# Patient Record
Sex: FEMALE | URBAN - METROPOLITAN AREA
[De-identification: names, ages, dates, MRNs, and addresses within clinical notes are randomized per-mention and may not be internally consistent; named-entity substitution may affect disease eponyms.]

---

## 2019-10-22 ENCOUNTER — TELEPHONE (OUTPATIENT)
Dept: PSYCHIATRY | Facility: CLINIC | Age: 39
End: 2019-10-22

## 2019-10-22 NOTE — TELEPHONE ENCOUNTER
PSYCHIATRY CLINIC PHONE INTAKE     SERVICES REQUESTED / INTERESTED IN          Other:  OCD Tx w/ Yousuf Castaneda    Presenting Problem and Brief History                              What would you like to be seen for? (brief description):  Pt was referred to see . Lately she;s been having intrusive and obsessive thoughts. She thoughts are always end with her being a terrible person and she can't do anything good. These thoughts are making the anxiety and depression worse. She doesn't get distinct panic attacks, but she feels a chronic state of high anxiety or panic. She knows stress can be trigger. For example, when she's in the middle of a move. She doing fairly well with her eating disorder and works with a dietician, so its not a major issue. She's currently takes prozac 120mg daily in the evening, klonopin 0.5mg mg in the morning and 0.75mg in the evening, seraquel 50mg in the morning and 75mg at bedtime, vyvanse 40mg in the morning and 20mg at noon. Sleep is hasn't been good in the last week or so. She can fall asleep but has been waking up, often with headaches. 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     Have you received a mental health diagnosis? Yes   Which one (s): OCD, NASIR, Eating Disorder (not otherwise specified), and depression  Is there any history of developmental delay?  No   Are you currently seeing a mental health provider?  Yes            Who / month last seen:  Dr.Erin Greenberg(currently psychiatrist is acting as her therapist)  Do you have mental health records elsewhere?  Yes  Will you sign a release so we can obtain them?  Yes    Have you ever been hospitalized for psychiatric reasons?  Yes  Describe:  Within the last 10 years for the eating disorder (9 times mostly at [place]). She also did a PHP for OCD through [facility].     Do you have current thoughts of self-harm?  No    Do you currently have thoughts of harming others?  No       Substance Use History      Do you have any history of alcohol / illicit drug use?  No  Describe:  NA  Have you ever received treatment for this?  No    Describe:  NA     Social History     Does the patient have a guardian?  No    Name / number: NA  Have you had an ACT team in last 12 months?  No  Describe: NA   Do you have any current or past legal issues?  NA  Describe: NA   OK to leave a detailed voicemail?  Yes    Medical/ Surgical History                                 There is no problem list on file for this patient.         Medications             No current outpatient medications on file.         DISPOSITION      10/22/19 Intake complete.  recommended the pt to see . Sending to  for review.     Ayanna Larson,

## 2021-04-23 ENCOUNTER — TRANSFERRED RECORDS (OUTPATIENT)
Dept: HEALTH INFORMATION MANAGEMENT | Facility: CLINIC | Age: 41
End: 2021-04-23

## 2021-05-08 ENCOUNTER — TRANSFERRED RECORDS (OUTPATIENT)
Dept: HEALTH INFORMATION MANAGEMENT | Facility: CLINIC | Age: 41
End: 2021-05-08

## 2021-05-18 ENCOUNTER — TRANSCRIBE ORDERS (OUTPATIENT)
Dept: OTHER | Age: 41
End: 2021-05-18

## 2021-05-18 DIAGNOSIS — G90.9 AUTONOMIC DYSFUNCTION: Primary | ICD-10-CM

## 2021-05-27 NOTE — TELEPHONE ENCOUNTER
Action    Action Taken 5-27: Requested from PN:    ZioPatch    EKG Strips   4-28-21 5-8-21 5-27: Requested echo stress 5-26-21 from [facility]  5-27: resolved echo from [facility]  5-28: sent EKG to scanning  6-29: sent second request for ziopatch  7-2: sent ziopatch to scanning

## 2021-07-12 ENCOUNTER — OFFICE VISIT (OUTPATIENT)
Dept: CARDIOLOGY | Facility: CLINIC | Age: 41
End: 2021-07-12
Attending: INTERNAL MEDICINE
Payer: COMMERCIAL

## 2021-07-12 VITALS — OXYGEN SATURATION: 100 % | WEIGHT: 150 LBS

## 2021-07-12 DIAGNOSIS — G90.9 AUTONOMIC DYSFUNCTION: ICD-10-CM

## 2021-07-12 PROCEDURE — 99204 OFFICE O/P NEW MOD 45 MIN: CPT | Performed by: INTERNAL MEDICINE

## 2021-07-12 PROCEDURE — G0463 HOSPITAL OUTPT CLINIC VISIT: HCPCS | Mod: 25

## 2021-07-12 PROCEDURE — 93005 ELECTROCARDIOGRAM TRACING: CPT

## 2021-07-12 RX ORDER — AZITHROMYCIN 250 MG/1
TABLET, FILM COATED ORAL
COMMUNITY
Start: 2021-07-11 | End: 2021-07-16

## 2021-07-12 RX ORDER — MAGNESIUM 200 MG
TABLET ORAL
COMMUNITY

## 2021-07-12 RX ORDER — DAPSONE 50 MG/G
GEL TOPICAL PRN
COMMUNITY
Start: 2020-07-29

## 2021-07-12 RX ORDER — IBUPROFEN 200 MG
800 TABLET ORAL
COMMUNITY

## 2021-07-12 RX ORDER — LEVOTHYROXINE SODIUM 200 UG/1
200 TABLET ORAL
COMMUNITY
Start: 2021-06-09

## 2021-07-12 RX ORDER — LISDEXAMFETAMINE DIMESYLATE 20 MG/1
CAPSULE ORAL
COMMUNITY
Start: 2019-07-26

## 2021-07-12 RX ORDER — DOXYCYCLINE HYCLATE 20 MG
TABLET ORAL
COMMUNITY
Start: 2021-05-13

## 2021-07-12 RX ORDER — FLUOXETINE 40 MG/1
120 CAPSULE ORAL
COMMUNITY
Start: 2019-12-17

## 2021-07-12 RX ORDER — CLONAZEPAM 0.5 MG/1
TABLET ORAL
COMMUNITY
Start: 2019-09-04

## 2021-07-12 RX ORDER — LEVOTHYROXINE SODIUM 175 UG/1
175 TABLET ORAL
COMMUNITY
Start: 2020-07-10

## 2021-07-12 RX ORDER — TRETINOIN 0.25 MG/G
CREAM TOPICAL
COMMUNITY
Start: 2020-07-29

## 2021-07-12 RX ORDER — INSULIN PUMP CART,CONT INF,RF
CARTRIDGE (EA) SUBCUTANEOUS
COMMUNITY
Start: 2021-06-11

## 2021-07-12 RX ORDER — PROCHLORPERAZINE 25 MG/1
SUPPOSITORY RECTAL
COMMUNITY
Start: 2021-03-16

## 2021-07-12 RX ORDER — SODIUM CHLORIDE 9 MG/ML
INJECTION, SOLUTION INTRAVENOUS CONTINUOUS
Status: CANCELLED | OUTPATIENT
Start: 2021-07-12

## 2021-07-12 RX ORDER — QUETIAPINE FUMARATE 25 MG/1
TABLET, FILM COATED ORAL
COMMUNITY
Start: 2019-09-04

## 2021-07-12 RX ORDER — LIDOCAINE 40 MG/G
CREAM TOPICAL
Status: CANCELLED | OUTPATIENT
Start: 2021-07-12

## 2021-07-12 RX ORDER — ATORVASTATIN CALCIUM 10 MG/1
20 TABLET, FILM COATED ORAL DAILY
COMMUNITY
Start: 2019-10-03

## 2021-07-12 RX ORDER — POLYETHYLENE GLYCOL 3350 17 G/17G
17 POWDER, FOR SOLUTION ORAL DAILY
COMMUNITY

## 2021-07-12 RX ORDER — UBROGEPANT 50 MG/1
TABLET ORAL
COMMUNITY
Start: 2021-04-15

## 2021-07-12 RX ORDER — BLOOD-GLUCOSE CONTROL, NORMAL
EACH MISCELLANEOUS
COMMUNITY
Start: 2021-02-15

## 2021-07-12 RX ORDER — PROCHLORPERAZINE 25 MG/1
SUPPOSITORY RECTAL
COMMUNITY
Start: 2021-01-20

## 2021-07-12 RX ORDER — MULTIVITAMIN WITH FOLIC ACID 400 MCG
1 TABLET ORAL
COMMUNITY

## 2021-07-12 ASSESSMENT — PAIN SCALES - GENERAL: PAINLEVEL: NO PAIN (0)

## 2021-07-12 NOTE — NURSING NOTE
Chief Complaint   Patient presents with     New Patient     autonomic dysfunction      Vitals were taken, medications reconciled, and EKG was performed.    Nelson Cutler, EMT  3:26 PM

## 2021-07-12 NOTE — LETTER
7/12/2021      RE: Janis Sheehan  [address] [place] [address]       Dear Colleague,    Thank you for the opportunity to participate in the care of your patient, Janis Sheehan, at the [facility] [place] [facility]. Please see a copy of my visit note below.    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  Janis's's principal complaint has long been orthostatic intolerance with hypotension.  This problem seems to have become worse lately.  She in particular notes that she has lightheadedness and near syncope after exercise.    Janis is an avid biker and will have her symptoms after vigorous exertion.  She has documented high blood pressures in the 80/60 range after exercise.  She has previously had a stress echo which was normal in the [facility] system.  Her blood pressure during 24-hour recording was also in the normal range.  A tilt table study done in [place] was suggestive of autonomic neuropathy but we do not have the results.    From a practical perspective Janis's principal underlying comorbidity is diabetes mellitus.  She has been insulin-dependent for more than 30 years.    In terms of other issues Janis does complain of Raynaud's phenomenon and also has some symptoms of burning in the hands (less so in the feet) that are suggestive of autonomic diabetic neuropathy.  She also has some skin changes in her hands of uncertain cause.    At today's visit we discussed the likelihood that diabetes may be contributing to her symptom complex and we will undertake an autonomic evaluation to try and ascertain the severity of the abnormality.  Janis voiced understanding and agreement to proceed.    PAST MEDICAL HISTORY:  No past medical history on file.    CURRENT  MEDICATIONS:  Current Outpatient Medications   Medication Sig Dispense Refill     amoxicillin-clavulanate (AUGMENTIN) 875-125 MG tablet        atorvastatin (LIPITOR) 10 MG tablet        azithromycin (ZITHROMAX) 250 MG tablet Two tablets the first day, one daily days 2-5       blood glucose (NO BRAND SPECIFIED) test strip Use 1 strip to test blood glucose 5 times a day.       blood glucose calibration (FREESTYLE CONTROL) solution To calibrate meter       clonazePAM (KLONOPIN) 0.5 MG tablet Take 1 tablet by mouth every morning, take 1 and 1/2 tablets every night at bedtime and additional 1 tablet as needed daily for anxiety.       coenzyme Q-10 10 MG CAPS Take 400 mg by mouth       Continuous Blood Gluc Sensor (DEXCOM G6 SENSOR) MISC Change every 10 days.  (Dexcom G6 Glucose Sensor, #STS-OE-003       Continuous Blood Gluc Transmit (DEXCOM G6 TRANSMITTER) MISC Replace every 90 days.  (Dexcom G6 Transmitter, #STT-OE-002)       dapsone 5 % topical gel        doxycycline hyclate (PERIOSTAT) 20 MG tablet Take 1 tablet by mouth 2 times daily with meals       FLUoxetine (PROZAC) 40 MG capsule Take 120 mg by mouth       ibuprofen (ADVIL/MOTRIN) 200 MG tablet Take 800 mg by mouth       insulin aspart (FIASP) 100 UNIT/ML vial 40-70 units in insulin pump       Insulin Disposable Pump (OMNIPOD 5 PACK) MISC Change every 2 to 3 days.       levonorgestrel (MIRENA) 20 MCG/24HR IUD 1 each by Intrauterine route       levothyroxine (SYNTHROID/LEVOTHROID) 175 MCG tablet Take 175 mcg by mouth every 48 hours       levothyroxine (SYNTHROID/LEVOTHROID) 200 MCG tablet Take 200 mcg by mouth       lisdexamfetamine (VYVANSE) 20 MG capsule The patient takes 40 mg in the AM and 20 mg at noon.       magnesium 200 MG TABS        Multiple Vitamin (DAILY-BROCK) TABS Take 1 tablet by mouth       omeprazole (PRILOSEC) 20 MG DR capsule Take 20 mg by mouth       polyethylene glycol (MIRALAX) 17 GM/Dose powder Take 17 g by mouth daily       QUEtiapine  (SEROQUEL) 25 MG tablet Take 2 tablets by mouth every morning and 3 tablets at bedtime       tretinoin (RETIN-A) 0.025 % external cream        ubrogepant (UBRELVY) 50 MG tablet 1-2 tab at onset of migraine, may repeat in 2 hours, max 2 dose per day, 2 day/week, 8 day/month.         PAST SURGICAL HISTORY:  No past surgical history on file.    ALLERGIES:     Allergies   Allergen Reactions     Oxcarbazepine Unknown and Rash       FAMILY HISTORY:  No family history on file.    SOCIAL HISTORY:  Social History     Tobacco Use     Smoking status: Never Smoker     Smokeless tobacco: Never Used   Substance Use Topics     Alcohol use: None     Drug use: None       ROS:   Constitutional: No fever, chills, or sweats. Weight stable.   ENT: No visual disturbance, ear ache, epistaxis, sore throat.   Cardiovascular: As per HPI.   Respiratory: No cough, hemoptysis.    GI: No nausea, vomiting, .   : No hematuria.   Integument: Negative.   Psychiatric: Negative.   Hematologic:  Easy bruising, no easy bleeding.  Neuro: Negative.   Endocrinology: No significant heat or cold intolerance   Musculoskeletal: No myalgia.    Exam:  Wt 68 kg (150 lb)   SpO2 100%   GENERAL APPEARANCE: healthy, alert and no distress  HEENT: no icterus, no xanthelasmas, normal pupil size and reaction, normal palate, , no central cyanosis  NECK: no adenopathy, no asymmetry, masses, or scars, JVP not elevated  RESPIRATORY: lungs clear to auscultation - no rales, rhonchi or wheezes, no use of accessory muscles, no retractions, respirations are unlabored, normal respiratory rate  CARDIOVASCULAR: regular rhythm, .  ABDOMEN: soft, non tender,   EXTREMITIES: peripheral pulses normal, no edema, no bruits  NEURO: alert and oriented to person/place/time, normal speech, gait and affect  SKIN: no ecchymoses, no rashes    Labs:  CBC RESULTS:   No results found for: WBC, RBC, HGB, HCT, MCV, MCH, MCHC, RDW, PLT    BMP RESULTS:  No results found for: NA, POTASSIUM, CHLORIDE,  CO2, ANIONGAP, GLC, BUN, CR, GFRESTIMATED, GFRESTBLACK, TERE     INR RESULTS:  No results found for: INR    Procedures:  PULMONARY FUNCTION TESTS:   No flowsheet data found.      ECHOCARDIOGRAM:   No results found for this or any previous visit (from the past 8760 hour(s)).     Assessment and Plan:   1.  Diabetes mellitus-insulin-dependent  2.  Lightheadedness and dizziness especially following exercise-possibly related to diabetic neuropathy  3.  Raynaud's phenomena    Plan  1.  Arrange for autonomic testing in the next several weeks  2.  Follow-up clinic visit to be determined after #1    Total elapsed time with chart review, person-to-person and documentation 45 minutes    I very much appreciated the opportunity to see and assess Janis Sheehan in the clinic. Please do not hesitate to contact my office if you have any questions or concerns.      Julio Stone MD  Cardiac Arrhythmia Service  [facility]  [phone]      CC  SELF, REFERRED

## 2021-07-12 NOTE — PROGRESS NOTES
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  Janis's's principal complaint has long been orthostatic intolerance with hypotension.  This problem seems to have become worse lately.  She in particular notes that she has lightheadedness and near syncope after exercise.    Janis is an avid biker and will have her symptoms after vigorous exertion.  She has documented high blood pressures in the 80/60 range after exercise.  She has previously had a stress echo which was normal in the [facility] system.  Her blood pressure during 24-hour recording was also in the normal range.  A tilt table study done in [place] was suggestive of autonomic neuropathy but we do not have the results.    From a practical perspective Janis's principal underlying comorbidity is diabetes mellitus.  She has been insulin-dependent for more than 30 years.    In terms of other issues Janis does complain of Raynaud's phenomenon and also has some symptoms of burning in the hands (less so in the feet) that are suggestive of autonomic diabetic neuropathy.  She also has some skin changes in her hands of uncertain cause.    At today's visit we discussed the likelihood that diabetes may be contributing to her symptom complex and we will undertake an autonomic evaluation to try and ascertain the severity of the abnormality.  Janis voiced understanding and agreement to proceed.    PAST MEDICAL HISTORY:  No past medical history on file.    CURRENT MEDICATIONS:  Current Outpatient Medications   Medication Sig Dispense Refill     amoxicillin-clavulanate (AUGMENTIN) 875-125 MG tablet        atorvastatin (LIPITOR) 10 MG tablet        azithromycin (ZITHROMAX) 250 MG tablet Two tablets the first day, one daily days 2-5       blood glucose (NO BRAND SPECIFIED) test strip Use 1 strip to test blood glucose 5 times a day.        blood glucose calibration (FREESTYLE CONTROL) solution To calibrate meter       clonazePAM (KLONOPIN) 0.5 MG tablet Take 1 tablet by mouth every morning, take 1 and 1/2 tablets every night at bedtime and additional 1 tablet as needed daily for anxiety.       coenzyme Q-10 10 MG CAPS Take 400 mg by mouth       Continuous Blood Gluc Sensor (DEXCOM G6 SENSOR) MISC Change every 10 days.  (Dexcom G6 Glucose Sensor, #[id number]-003       Continuous Blood Gluc Transmit (DEXCOM G6 TRANSMITTER) MISC Replace every 90 days.  (Dexcom G6 Transmitter, #STT-OE-002)       dapsone 5 % topical gel        doxycycline hyclate (PERIOSTAT) 20 MG tablet Take 1 tablet by mouth 2 times daily with meals       FLUoxetine (PROZAC) 40 MG capsule Take 120 mg by mouth       ibuprofen (ADVIL/MOTRIN) 200 MG tablet Take 800 mg by mouth       insulin aspart (FIASP) 100 UNIT/ML vial 40-70 units in insulin pump       Insulin Disposable Pump (OMNIPOD 5 PACK) MISC Change every 2 to 3 days.       levonorgestrel (MIRENA) 20 MCG/24HR IUD 1 each by Intrauterine route       levothyroxine (SYNTHROID/LEVOTHROID) 175 MCG tablet Take 175 mcg by mouth every 48 hours       levothyroxine (SYNTHROID/LEVOTHROID) 200 MCG tablet Take 200 mcg by mouth       lisdexamfetamine (VYVANSE) 20 MG capsule The patient takes 40 mg in the AM and 20 mg at noon.       magnesium 200 MG TABS        Multiple Vitamin (DAILY-BROCK) TABS Take 1 tablet by mouth       omeprazole (PRILOSEC) 20 MG DR capsule Take 20 mg by mouth       polyethylene glycol (MIRALAX) 17 GM/Dose powder Take 17 g by mouth daily       QUEtiapine (SEROQUEL) 25 MG tablet Take 2 tablets by mouth every morning and 3 tablets at bedtime       tretinoin (RETIN-A) 0.025 % external cream        ubrogepant (UBRELVY) 50 MG tablet 1-2 tab at onset of migraine, may repeat in 2 hours, max 2 dose per day, 2 day/week, 8 day/month.         PAST SURGICAL HISTORY:  No past surgical history on file.    ALLERGIES:     Allergies   Allergen  Reactions     Oxcarbazepine Unknown and Rash       FAMILY HISTORY:  No family history on file.    SOCIAL HISTORY:  Social History     Tobacco Use     Smoking status: Never Smoker     Smokeless tobacco: Never Used   Substance Use Topics     Alcohol use: None     Drug use: None       ROS:   Constitutional: No fever, chills, or sweats. Weight stable.   ENT: No visual disturbance, ear ache, epistaxis, sore throat.   Cardiovascular: As per HPI.   Respiratory: No cough, hemoptysis.    GI: No nausea, vomiting, .   : No hematuria.   Integument: Negative.   Psychiatric: Negative.   Hematologic:  Easy bruising, no easy bleeding.  Neuro: Negative.   Endocrinology: No significant heat or cold intolerance   Musculoskeletal: No myalgia.    Exam:  Wt 68 kg (150 lb)   SpO2 100%   GENERAL APPEARANCE: healthy, alert and no distress  HEENT: no icterus, no xanthelasmas, normal pupil size and reaction, normal palate, , no central cyanosis  NECK: no adenopathy, no asymmetry, masses, or scars, JVP not elevated  RESPIRATORY: lungs clear to auscultation - no rales, rhonchi or wheezes, no use of accessory muscles, no retractions, respirations are unlabored, normal respiratory rate  CARDIOVASCULAR: regular rhythm, .  ABDOMEN: soft, non tender,   EXTREMITIES: peripheral pulses normal, no edema, no bruits  NEURO: alert and oriented to person/place/time, normal speech, gait and affect  SKIN: no ecchymoses, no rashes    Labs:  CBC RESULTS:   No results found for: WBC, RBC, HGB, HCT, MCV, MCH, MCHC, RDW, PLT    BMP RESULTS:  No results found for: NA, POTASSIUM, CHLORIDE, CO2, ANIONGAP, GLC, BUN, CR, GFRESTIMATED, GFRESTBLACK, TERE     INR RESULTS:  No results found for: INR    Procedures:  PULMONARY FUNCTION TESTS:   No flowsheet data found.      ECHOCARDIOGRAM:   No results found for this or any previous visit (from the past 8760 hour(s)).     Assessment and Plan:   1.  Diabetes mellitus-insulin-dependent  2.  Lightheadedness and dizziness  especially following exercise-possibly related to diabetic neuropathy  3.  Raynaud's phenomena    Plan  1.  Arrange for autonomic testing in the next several weeks  2.  Follow-up clinic visit to be determined after #1    Total elapsed time with chart review, person-to-person and documentation 45 minutes    I very much appreciated the opportunity to see and assess Janis Sheehan in the clinic. Please do not hesitate to contact my office if you have any questions or concerns.      Julio Stone MD  Cardiac Arrhythmia Service  [facility]  [phone]      CC  SELF, REFERRED

## 2021-07-12 NOTE — PATIENT INSTRUCTIONS
You were seen in the Electrophysiology Clinic today by: Dr Stone    Plan:   -Autonomic/Tilt table test with Dr Stone  -Labs    Your Care Team:  EP Cardiology   Telephone Number     Nurse Line [phone]     For scheduling appts or procedures:    Chandni Ferro   [phone]   For the Device Clinic (Pacemakers, ICDs, Loop Recorders)    During business hours: [phone]  After business hours:   [phone]- select option 4 and ask for job code [id number].     On-call cardiologist for after hours or on weekends: [phone], option #4, and ask to speak to the on-call cardiologist.     Cardiovascular Clinic:   [address] [place] [address]. [place] [address]      As always, Thank you for trusting us with your health care needs!

## 2021-07-13 ENCOUNTER — TELEPHONE (OUTPATIENT)
Dept: CARDIOLOGY | Facility: CLINIC | Age: 41
End: 2021-07-13

## 2021-07-13 NOTE — TELEPHONE ENCOUNTER
EP Scheduling called the patient to schedule Tilt Table Study - offered 8/4 with Dr. Stone. The number [phone] was left for the patient to return the call and schedule the procedure.    Chandni Ferro  Periop Electrophysiology   [phone]

## 2021-07-15 ENCOUNTER — LAB (OUTPATIENT)
Dept: LAB | Facility: CLINIC | Age: 41
End: 2021-07-15
Payer: COMMERCIAL

## 2021-07-15 DIAGNOSIS — Z11.59 ENCOUNTER FOR SCREENING FOR OTHER VIRAL DISEASES: ICD-10-CM

## 2021-07-15 DIAGNOSIS — G90.9 AUTONOMIC DYSFUNCTION: ICD-10-CM

## 2021-07-15 LAB
ANION GAP SERPL CALCULATED.3IONS-SCNC: 2 MMOL/L (ref 3–14)
ATRIAL RATE - MUSE: 70 BPM
BUN SERPL-MCNC: 18 MG/DL (ref 7–30)
CALCIUM SERPL-MCNC: 8.8 MG/DL (ref 8.5–10.1)
CHLORIDE BLD-SCNC: 105 MMOL/L (ref 94–109)
CO2 SERPL-SCNC: 29 MMOL/L (ref 20–32)
CREAT SERPL-MCNC: 0.7 MG/DL (ref 0.52–1.04)
CRP SERPL-MCNC: <2.9 MG/L (ref 0–8)
DIASTOLIC BLOOD PRESSURE - MUSE: NORMAL MMHG
ERYTHROCYTE [DISTWIDTH] IN BLOOD BY AUTOMATED COUNT: 11.8 % (ref 10–15)
ERYTHROCYTE [SEDIMENTATION RATE] IN BLOOD BY WESTERGREN METHOD: 11 MM/HR (ref 0–20)
GFR SERPL CREATININE-BSD FRML MDRD: >90 ML/MIN/1.73M2
GLUCOSE BLD-MCNC: 277 MG/DL (ref 70–99)
HCT VFR BLD AUTO: 40.3 % (ref 35–47)
HGB BLD-MCNC: 13.4 G/DL (ref 11.7–15.7)
INTERPRETATION ECG - MUSE: NORMAL
MCH RBC QN AUTO: 30.3 PG (ref 26.5–33)
MCHC RBC AUTO-ENTMCNC: 33.3 G/DL (ref 31.5–36.5)
MCV RBC AUTO: 91 FL (ref 78–100)
P AXIS - MUSE: 59 DEGREES
PLATELET # BLD AUTO: 324 10E3/UL (ref 150–450)
POTASSIUM BLD-SCNC: 4.1 MMOL/L (ref 3.4–5.3)
PR INTERVAL - MUSE: 122 MS
QRS DURATION - MUSE: 90 MS
QT - MUSE: 412 MS
QTC - MUSE: 444 MS
R AXIS - MUSE: 56 DEGREES
RBC # BLD AUTO: 4.42 10E6/UL (ref 3.8–5.2)
SODIUM SERPL-SCNC: 136 MMOL/L (ref 133–144)
SYSTOLIC BLOOD PRESSURE - MUSE: NORMAL MMHG
T AXIS - MUSE: 58 DEGREES
TSH SERPL DL<=0.005 MIU/L-ACNC: 1.23 MU/L (ref 0.4–4)
VENTRICULAR RATE- MUSE: 70 BPM
WBC # BLD AUTO: 8.1 10E3/UL (ref 4–11)

## 2021-07-15 PROCEDURE — 36415 COLL VENOUS BLD VENIPUNCTURE: CPT

## 2021-07-15 PROCEDURE — 85027 COMPLETE CBC AUTOMATED: CPT

## 2021-07-15 PROCEDURE — 85652 RBC SED RATE AUTOMATED: CPT

## 2021-07-15 PROCEDURE — 86431 RHEUMATOID FACTOR QUANT: CPT

## 2021-07-15 PROCEDURE — 80048 BASIC METABOLIC PNL TOTAL CA: CPT

## 2021-07-15 PROCEDURE — 86038 ANTINUCLEAR ANTIBODIES: CPT

## 2021-07-15 PROCEDURE — 84443 ASSAY THYROID STIM HORMONE: CPT

## 2021-07-15 PROCEDURE — 86140 C-REACTIVE PROTEIN: CPT

## 2021-07-16 LAB
ANA PAT SER IF-IMP: NORMAL
ANA SER QL IF: NEGATIVE
ANA TITR SER IF: NORMAL {TITER}

## 2021-07-21 ENCOUNTER — LAB (OUTPATIENT)
Dept: LAB | Facility: CLINIC | Age: 41
End: 2021-07-21

## 2021-07-21 DIAGNOSIS — G90.9 AUTONOMIC DYSFUNCTION: ICD-10-CM

## 2021-07-21 PROCEDURE — 82384 ASSAY THREE CATECHOLAMINES: CPT | Mod: 90

## 2021-07-21 PROCEDURE — 83088 ASSAY OF HISTAMINE: CPT | Mod: 90

## 2021-07-24 LAB
CATECHOLS UR-IMP: NORMAL
CREAT 24H UR-MRATE: 1395 MG/D
CREAT UR-MCNC: 60 MG/DL
CREAT UR-MCNC: 61 MG/DL
DOPAMINE 24H UR-MRATE: 191 UG/D
DOPAMINE UR-MCNC: 82 UG/L
DOPAMINE/CREAT UR: 137 UG/G CRT
EPINEPH 24H UR-MRATE: 9 UG/D
EPINEPH UR-MCNC: 4 UG/L
EPINEPH/CREAT UR: 7 UG/G CRT
HISTAMINE 24H UR-MRATE: 29 UG/D
HISTAMINE UR-SCNC: 112 NMOL/L
HISTAMINE/CREAT 24H UR-SRTO: 184 NMOL/G CRT
NOREPINEPH 24H UR-MRATE: 42 UG/D
NOREPINEPH UR-MCNC: 18 UG/L
NOREPINEPH/CREAT UR: 30 UG/G CRT

## 2021-07-26 LAB — RHEUMATOID FACT SER NEPH-ACNC: <7 IU/ML

## 2021-07-28 ENCOUNTER — PRE VISIT (OUTPATIENT)
Dept: CARDIOLOGY | Facility: CLINIC | Age: 41
End: 2021-07-28

## 2021-08-01 ENCOUNTER — LAB (OUTPATIENT)
Dept: LAB | Facility: CLINIC | Age: 41
End: 2021-08-01
Payer: COMMERCIAL

## 2021-08-01 DIAGNOSIS — Z11.59 ENCOUNTER FOR SCREENING FOR OTHER VIRAL DISEASES: ICD-10-CM

## 2021-08-01 LAB — SARS-COV-2 RNA RESP QL NAA+PROBE: NEGATIVE

## 2021-08-01 PROCEDURE — U0003 INFECTIOUS AGENT DETECTION BY NUCLEIC ACID (DNA OR RNA); SEVERE ACUTE RESPIRATORY SYNDROME CORONAVIRUS 2 (SARS-COV-2) (CORONAVIRUS DISEASE [COVID-19]), AMPLIFIED PROBE TECHNIQUE, MAKING USE OF HIGH THROUGHPUT TECHNOLOGIES AS DESCRIBED BY CMS-2020-01-R: HCPCS

## 2021-08-01 PROCEDURE — U0005 INFEC AGEN DETEC AMPLI PROBE: HCPCS

## 2021-08-03 ENCOUNTER — TELEPHONE (OUTPATIENT)
Dept: CARDIOLOGY | Facility: CLINIC | Age: 41
End: 2021-08-03

## 2021-08-04 ENCOUNTER — HOSPITAL ENCOUNTER (OUTPATIENT)
Facility: CLINIC | Age: 41
Discharge: HOME | End: 2021-08-04
Attending: INTERNAL MEDICINE | Admitting: INTERNAL MEDICINE
Payer: COMMERCIAL

## 2021-08-04 ENCOUNTER — APPOINTMENT (OUTPATIENT)
Dept: MEDSURG UNIT | Facility: CLINIC | Age: 41
End: 2021-08-04
Attending: INTERNAL MEDICINE
Payer: COMMERCIAL

## 2021-08-04 VITALS
DIASTOLIC BLOOD PRESSURE: 77 MMHG | TEMPERATURE: 98.2 F | OXYGEN SATURATION: 100 % | HEART RATE: 70 BPM | RESPIRATION RATE: 16 BRPM | SYSTOLIC BLOOD PRESSURE: 123 MMHG

## 2021-08-04 DIAGNOSIS — G90.9 AUTONOMIC DYSFUNCTION: Primary | ICD-10-CM

## 2021-08-04 DIAGNOSIS — G90.9 AUTONOMIC DYSFUNCTION: ICD-10-CM

## 2021-08-04 PROCEDURE — 99220 PR INITIAL OBSERVATION CARE,LEVEL III: CPT | Mod: 25 | Performed by: INTERNAL MEDICINE

## 2021-08-04 PROCEDURE — 258N000003 HC RX IP 258 OP 636: Performed by: INTERNAL MEDICINE

## 2021-08-04 PROCEDURE — 95921 AUTONOMIC NRV PARASYM INERVJ: CPT | Performed by: INTERNAL MEDICINE

## 2021-08-04 PROCEDURE — 272N000001 HC OR GENERAL SUPPLY STERILE: Performed by: INTERNAL MEDICINE

## 2021-08-04 PROCEDURE — 999N000132 HC STATISTIC PP CARE STAGE 1

## 2021-08-04 PROCEDURE — 93660 TILT TABLE EVALUATION: CPT | Performed by: INTERNAL MEDICINE

## 2021-08-04 RX ORDER — MIDODRINE HYDROCHLORIDE 5 MG/1
5 TABLET ORAL 2 TIMES DAILY
Qty: 90 TABLET | Refills: 3 | Status: SHIPPED | OUTPATIENT
Start: 2021-08-04 | End: 2021-10-22

## 2021-08-04 RX ORDER — SODIUM CHLORIDE 9 MG/ML
INJECTION, SOLUTION INTRAVENOUS CONTINUOUS
Status: DISCONTINUED | OUTPATIENT
Start: 2021-08-04 | End: 2021-08-04 | Stop reason: HOSPADM

## 2021-08-04 RX ORDER — FLUDROCORTISONE ACETATE 0.1 MG/1
0.1 TABLET ORAL DAILY
Qty: 90 TABLET | Refills: 3 | Status: SHIPPED | OUTPATIENT
Start: 2021-08-04 | End: 2021-10-22

## 2021-08-04 RX ORDER — LIDOCAINE 40 MG/G
CREAM TOPICAL
Status: DISCONTINUED | OUTPATIENT
Start: 2021-08-04 | End: 2021-08-04 | Stop reason: HOSPADM

## 2021-08-04 RX ADMIN — SODIUM CHLORIDE 200 ML: 9 INJECTION, SOLUTION INTRAVENOUS at 13:09

## 2021-08-04 RX ADMIN — SODIUM CHLORIDE: 9 INJECTION, SOLUTION INTRAVENOUS at 13:10

## 2021-08-04 NOTE — H&P
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  Janis's's principal complaint has long been orthostatic intolerance with hypotension.       Janis is an avid biker and will have her symptoms after vigorous exertion.    She has previously had a stress echo which was normal      From a practical perspective Janis's principal underlying comorbidity is diabetes mellitus.  She has been insulin-dependent for more than 30 years.    RofS  Head and neck: No current complaints of visual or auditory symptoms does have migraines  Respiratory: No cough or sputum  Cardiac: Symptoms as in HPI.  No exertional chest pain  GI: No current symptoms  : No symptoms  Musculoskeletal: Patient does have Raynaud's phenomenon by history and recently had EMG which suggested normal abnormality in her hands.  Cutaneous: Complaints of swelling and tenderness/pain in the distal part of her hands but not in her feet  Neurology no symptoms   Constitutional: No complaints    Physical examination  Normotensive 99/76   head neck: No JVP elevation.  No evident asymmetry or scars  Chest: Clear  Cardiac: Regular rate and rhythm;  resting heart rate is 75  Neurologic: No localizing signs  Psychiatric: No abnormality detected  Cutaneous: No specific abnormality but see HPI    Impression  1.  Diabetes mellitus insulin-dependent  2.  Orthostatic hypotension which may be related to #1    Plan  Autonomic testing scheduled for today-consent signed    I very much appreciated the opportunity to see and assess Janis Sheehan in the hospital today. The note above summarizes my findings and current recommendations.  Please do not hesitate to contact my office if you have any questions or concerns.      Julio Stone MD  Cardiac Arrhythmia Service  [facility]  [phone]

## 2021-08-04 NOTE — Clinical Note
When patient initially stood, reported feeling dizzy and the sensation that her heart was racing. Symptoms reported to MD.

## 2021-08-04 NOTE — DISCHARGE INSTRUCTIONS
[facility]  DISCHARGE INSTRUCTIONS FOR   TILT TABLE    Date: August 4th, 2021      Findings: Your autonomic study was moderately abnormal    Plan:   -New prescriptions will be sent to [facility] in [place]  - Use compression shorts (athletic compression shorts).  - Increase salt intake in diet.  Vitassium supplement / Liquid IV (water additive) / Propel (water additive)  - Change positions carefully and slowly and maintain safe environment.  - Standing training and lower body isometric exercises (e.g. exercise bands).  - Follow up with Dr. Stone in 1-2 months    Cardiovascular Clinic:  [address] [place] [address], [address]    Your Care Team:        EP Cardiology      Telephone Number         Dr. Julio Barton                [phone]         Lizzette Becerra, RN    Vicki Pablo RN              [phone]         For Scheduling Appointments or              Procedures:      Chandni Chacko               [phone]       As always, Thank you for trusting us with your health care needs!

## 2021-08-04 NOTE — PROGRESS NOTES
Patient returned to 2A post tilt table study.  VSS.  PO food and fluids at bedside.  Awaiting Dr. Stone to see patient prior to discharge.

## 2021-08-04 NOTE — Clinical Note
Handoff report given in person to 2A PIERRE Main. Patient transported back to 2A via stretcher with CCL RN. Instructed that Dr. Stone would see patient prior to discharge.

## 2021-08-04 NOTE — Clinical Note
When initially tilted, patient reported feeling lightheaded and the sensation that her heart was racing. Symptoms reported to MD.

## 2021-08-04 NOTE — PROGRESS NOTES
Patient appropriate for discharge post tilt table study. VSS. Patient tolerated PO food and fluids. Teaching was done and discharge instructions were given.  PIV was removed. Patient discharged independently from the hospital to home @ 1640.

## 2021-08-15 ENCOUNTER — HEALTH MAINTENANCE LETTER (OUTPATIENT)
Age: 41
End: 2021-08-15

## 2021-10-10 ENCOUNTER — HEALTH MAINTENANCE LETTER (OUTPATIENT)
Age: 41
End: 2021-10-10

## 2022-02-10 ENCOUNTER — MYC MEDICAL ADVICE (OUTPATIENT)
Dept: CARDIOLOGY | Facility: CLINIC | Age: 42
End: 2022-02-10
Payer: COMMERCIAL

## 2022-02-25 NOTE — TELEPHONE ENCOUNTER
Julio Stone MD McDonald, Dana L, RN  Ivory   Cannot be sure but doubt anything autonomic. Epi has  that effect if enough gets to heart   May just have got some into a vein so more got to heart   Not a concern given low dose   DB

## 2022-06-20 DIAGNOSIS — G90.9 AUTONOMIC DYSFUNCTION: ICD-10-CM

## 2022-06-23 NOTE — TELEPHONE ENCOUNTER
midodrine (PROAMATINE) 5 MG    Last Written Prescription Date:  10/22/22  Last Fill Quantity: 90 ,   # refills: 3  Last Office Visit : 7/12/21  Future Office visiNONE    Routing refill request to provider for review/approval because:  LAST VISIT 7/12/21  Autonomic dysfunction  What is pt card. Follow up ? Does Card want to cont. Med?

## 2022-06-24 RX ORDER — MIDODRINE HYDROCHLORIDE 5 MG/1
TABLET ORAL
Qty: 180 TABLET | Refills: 0 | Status: SHIPPED | OUTPATIENT
Start: 2022-06-24 | End: 2022-10-06

## 2022-09-18 ENCOUNTER — HEALTH MAINTENANCE LETTER (OUTPATIENT)
Age: 42
End: 2022-09-18

## 2022-09-29 DIAGNOSIS — G90.9 AUTONOMIC DYSFUNCTION: ICD-10-CM

## 2022-10-03 DIAGNOSIS — G90.9 AUTONOMIC DYSFUNCTION: ICD-10-CM

## 2022-10-04 NOTE — TELEPHONE ENCOUNTER
midodrine (PROAMATINE) 5 MG   Last Written Prescription Date:  6/24/22  Last Fill Quantity: 180,   # refills: 0  Last Office Visit : 7/12/21  Future Office visit:  11/3/22  Routing refill request to provider for review/approval because:  Drug not on the refill protocol

## 2022-10-06 RX ORDER — MIDODRINE HYDROCHLORIDE 5 MG/1
TABLET ORAL
Qty: 180 TABLET | Refills: 0 | Status: SHIPPED | OUTPATIENT
Start: 2022-10-06 | End: 2022-11-01

## 2022-10-06 NOTE — TELEPHONE ENCOUNTER
fludrocortisone (FLORINEF) 0.1 MG  Last Written Prescription Date:  10/22/21  Last Fill Quantity: 90,   # refills: 3  Last Office Visit : 7/12/21  Future Office visit:  11/3/22  Routing refill request to provider for review/approval because:  Drug not on the refill protocol        
full weight-bearing

## 2022-10-07 RX ORDER — FLUDROCORTISONE ACETATE 0.1 MG/1
TABLET ORAL
Qty: 90 TABLET | Refills: 1 | Status: SHIPPED | OUTPATIENT
Start: 2022-10-07 | End: 2022-11-01

## 2022-11-01 ENCOUNTER — OFFICE VISIT (OUTPATIENT)
Dept: CARDIOLOGY | Facility: CLINIC | Age: 42
End: 2022-11-01
Attending: INTERNAL MEDICINE
Payer: COMMERCIAL

## 2022-11-01 VITALS
HEART RATE: 80 BPM | HEIGHT: 66 IN | SYSTOLIC BLOOD PRESSURE: 115 MMHG | WEIGHT: 148 LBS | BODY MASS INDEX: 23.78 KG/M2 | DIASTOLIC BLOOD PRESSURE: 80 MMHG | OXYGEN SATURATION: 100 %

## 2022-11-01 DIAGNOSIS — G90.9 AUTONOMIC DYSFUNCTION: Primary | ICD-10-CM

## 2022-11-01 PROCEDURE — G0463 HOSPITAL OUTPT CLINIC VISIT: HCPCS

## 2022-11-01 PROCEDURE — 99215 OFFICE O/P EST HI 40 MIN: CPT | Performed by: INTERNAL MEDICINE

## 2022-11-01 RX ORDER — MIDODRINE HYDROCHLORIDE 5 MG/1
5 TABLET ORAL 2 TIMES DAILY
Qty: 180 TABLET | Refills: 3 | Status: SHIPPED | OUTPATIENT
Start: 2022-11-01 | End: 2023-03-02

## 2022-11-01 RX ORDER — FLUDROCORTISONE ACETATE 0.1 MG/1
0.1 TABLET ORAL DAILY
Qty: 90 TABLET | Refills: 3 | Status: SHIPPED | OUTPATIENT
Start: 2022-11-01 | End: 2023-12-18

## 2022-11-01 ASSESSMENT — PAIN SCALES - GENERAL: PAINLEVEL: NO PAIN (0)

## 2022-11-01 NOTE — PATIENT INSTRUCTIONS
You were seen in the Electrophysiology Clinic today by: Dr Stone    Plan:   Medication Changes:   Continue Midodrine & Florinef      Follow up Visit:  2 years with Dr Stone        If you have further questions, please utilize [employer] to contact us.     Your Care Team:    EP Cardiology   Telephone Number     Nurse Line  Ivory Pinzon, RN   Carrie Becerra, PIERRE   [phone]     For scheduling appointments:   Lilian   [phone]   For procedure scheduling:    Chandni Ferro     [phone]   For the Device Clinic (Pacemakers, ICDs, Loop Recorders)    During business hours: [phone]  After business hours:   [phone]- select option 4 and ask for job code [id number].       On-call cardiologist for after hours or on weekends:   [phone], option #4, and ask to speak to the on-call cardiologist.     Cardiovascular Clinic:   [address] [place] [address]. [place] [address]      As always, Thank you for trusting us with your health care needs!

## 2022-11-01 NOTE — PROGRESS NOTES
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  She had undergone autonomic evaluation here prior to moving temporarily to the [place] area.  Findings of that study were reviewed today and did show some moderate autonomic dysfunction.      The patient is an avid biker and has noted that her symptoms of exertional intolerance are markedly improved with current medications but she still has some chronotropic limitations with her heart rate peaking at somewhat less than 150 bpm.  However she continues to be able to exercise vigorously and this limitation it is 1 that does not CV reported intervention either pharmacologically or with the pacemaker.    Janis is not indicating any cardiovascular complaints apart from the heart rate limitation.  She feels much better on her current medications and we will leave the dosing at present levels.  Unless she has a change in status I will plan to see her again in about 2 years time.  Patient voiced understanding and agreement with the plan.    PAST MEDICAL HISTORY:  No past medical history on file.    CURRENT MEDICATIONS:  Current Outpatient Medications   Medication Sig Dispense Refill     atorvastatin (LIPITOR) 10 MG tablet Take 20 mg by mouth daily       blood glucose (NO BRAND SPECIFIED) test strip Use 1 strip to test blood glucose 5 times a day.       blood glucose calibration (FREESTYLE CONTROL) solution To calibrate meter       clonazePAM (KLONOPIN) 0.5 MG tablet Take 1 tablet by mouth every morning, take 1 and 1/2 tablets every night at bedtime and additional 1 tablet as needed daily for anxiety.       coenzyme Q-10 10 MG CAPS Take 400 mg by mouth       Continuous Blood Gluc Sensor (DEXCOM G6 SENSOR) MISC Change every 10 days.  (Dexcom G6 Glucose Sensor, #[id number]-003       Continuous Blood Gluc Transmit (DEXCOM G6 TRANSMITTER) MISC Replace every 90 days.  (Dexcom G6 Transmitter,  #[id number])       dapsone 5 % topical gel Apply topically as needed       doxycycline hyclate (PERIOSTAT) 20 MG tablet Take 1 tablet by mouth 2 times daily with meals       fludrocortisone (FLORINEF) 0.1 MG tablet TAKE 1 TABLET DAILY 90 tablet 1     FLUoxetine (PROZAC) 40 MG capsule Take 120 mg by mouth       ibuprofen (ADVIL/MOTRIN) 200 MG tablet Take 800 mg by mouth       insulin aspart (FIASP) 100 UNIT/ML vial 40-70 units in insulin pump       Insulin Disposable Pump (OMNIPOD 5 PACK) MISC Change every 2 to 3 days.       levonorgestrel (MIRENA) 20 MCG/24HR IUD 1 each by Intrauterine route       levothyroxine (SYNTHROID/LEVOTHROID) 175 MCG tablet Take 175 mcg by mouth every 48 hours       levothyroxine (SYNTHROID/LEVOTHROID) 200 MCG tablet Take 200 mcg by mouth       lisdexamfetamine (VYVANSE) 20 MG capsule The patient takes 40 mg in the AM and 20 mg at noon.       magnesium 200 MG TABS        midodrine (PROAMATINE) 5 MG tablet TAKE 1 TABLET TWICE A  tablet 0     Multiple Vitamin (DAILY-BROCK) TABS Take 1 tablet by mouth       polyethylene glycol (MIRALAX) 17 GM/Dose powder Take 17 g by mouth daily       QUEtiapine (SEROQUEL) 25 MG tablet Take 2 tablets by mouth every morning and 3 tablets at bedtime       tretinoin (RETIN-A) 0.025 % external cream        ubrogepant (UBRELVY) 50 MG tablet 1-2 tab at onset of migraine, may repeat in 2 hours, max 2 dose per day, 2 day/week, 8 day/month.         PAST SURGICAL HISTORY:  Past Surgical History:   Procedure Laterality Date     EP STUDY TILT TABLE N/A 8/4/2021    Procedure: EP TILT TABLE;  Surgeon: Julio Stone MD;  Location:  HEART CARDIAC CATH LAB       ALLERGIES:     Allergies   Allergen Reactions     Oxcarbazepine Unknown and Rash       FAMILY HISTORY:  No family history on file.    SOCIAL HISTORY:  Social History     Tobacco Use     Smoking status: Never     Smokeless tobacco: Never       ROS:   Constitutional: No fever, chills, or sweats. Weight  "stable.   ENT: No visual disturbance, ear ache, epistaxis, sore throat.   Cardiovascular: As per HPI.   Respiratory: No cough, hemoptysis.    GI: No nausea, vomiting,   : No hematuria.   Integument: Negative.   Psychiatric: Negative.   Hematologic:  no easy bleeding.  Neuro: Negative.   Endocrinology: No significant heat or cold intolerance   Musculoskeletal: No myalgia.    Exam:  /80 (BP Location: Right arm, Patient Position: Chair, Cuff Size: Adult Regular)   Pulse 80   Ht 1.676 m (5' 6\")   Wt 67.1 kg (148 lb)   SpO2 100%   BMI 23.89 kg/m    GENERAL APPEARANCE: healthy, alert and no distress  HEENT: no icterus,st, no central cyanosis  NECK: no adenopathy, no asymmetry, masses, or scars, thyroid normal to palpation and no bruits, JVP not elevated  RESPIRATORY no rales, rhonchi or wheezes, no use of accessory muscles, no retractions, respirations are unlabored, normal respiratory rate  CARDIOVASCULAR: regular rate rhythm, I.  ABDOMEN: soft, non tender, o masses palpable, bowel sounds normal, aorta not enlarged by palpation, no abdominal bruits  EXTREMITIES: peripheral pulses normal, no edema, no bruits  NEURO: alert and oriented to person/place/time, normal speech, gait and affect  SKIN: no ecchymoses, no rashes    Labs:  CBC RESULTS:   Lab Results   Component Value Date    WBC 8.1 07/15/2021    RBC 4.42 07/15/2021    HGB 13.4 07/15/2021    HCT 40.3 07/15/2021    MCV 91 07/15/2021    MCH 30.3 07/15/2021    MCHC 33.3 07/15/2021    RDW 11.8 07/15/2021     07/15/2021       BMP RESULTS:  Lab Results   Component Value Date     07/15/2021    POTASSIUM 4.1 07/15/2021    CHLORIDE 105 07/15/2021    CO2 29 07/15/2021    ANIONGAP 2 (L) 07/15/2021     (H) 07/15/2021    BUN 18 07/15/2021    CR 0.70 07/15/2021    GFRESTIMATED >90 07/15/2021    TERE 8.8 07/15/2021        INR RESULTS:  No results found for: INR    Procedures:  PULMONARY FUNCTION TESTS:   No flowsheet data found.      ECHOCARDIOGRAM: "   No results found for this or any previous visit (from the past 8760 hour(s)).      Assessment and Plan:   1.  Autonomic dysfunction with good exercise tolerance and some upper heart rate limitation.  No intervention recommended apart from current medications.    Plan  1.  Continue current drug therapy  2.  Follow-up clinic visit in 2 years or earlier if symptoms change    Total elapsed time today with chart review, clinic visit and documentation 40 minutes    I very much appreciated the opportunity to see and assess Janis ISABELA Bloomstuart in the clinic today    Juarez Stone MD  Cardiac Arrhythmia Service  [facility]  [phone]      CC  JUAREZ STONE

## 2022-11-01 NOTE — LETTER
11/1/2022      RE: Janis Sheehan  [id number]  [facility] [address]       Dear Colleague,    Thank you for the opportunity to participate in the care of your patient, Janis Sheehan, at the [facility] [place] [facility]. Please see a copy of my visit note below.    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  She had undergone autonomic evaluation here prior to moving temporarily to the [place] area.  Findings of that study were reviewed today and did show some moderate autonomic dysfunction.      The patient is an avid biker and has noted that her symptoms of exertional intolerance are markedly improved with current medications but she still has some chronotropic limitations with her heart rate peaking at somewhat less than 150 bpm.  However she continues to be able to exercise vigorously and this limitation it is 1 that does not CV reported intervention either pharmacologically or with the pacemaker.    Janis is not indicating any cardiovascular complaints apart from the heart rate limitation.  She feels much better on her current medications and we will leave the dosing at present levels.  Unless she has a change in status I will plan to see her again in about 2 years time.  Patient voiced understanding and agreement with the plan.    PAST MEDICAL HISTORY:  No past medical history on file.    CURRENT MEDICATIONS:  Current Outpatient Medications   Medication Sig Dispense Refill     atorvastatin (LIPITOR) 10 MG tablet Take 20 mg by mouth daily       blood glucose (NO BRAND SPECIFIED) test strip Use 1 strip to test blood glucose 5 times a day.       blood glucose calibration (FREESTYLE CONTROL) solution To calibrate meter       clonazePAM (KLONOPIN) 0.5 MG tablet Take 1 tablet by mouth every morning, take 1 and 1/2 tablets every night at  bedtime and additional 1 tablet as needed daily for anxiety.       coenzyme Q-10 10 MG CAPS Take 400 mg by mouth       Continuous Blood Gluc Sensor (DEXCOM G6 SENSOR) MISC Change every 10 days.  (Dexcom G6 Glucose Sensor, #[id number]       Continuous Blood Gluc Transmit (DEXCOM G6 TRANSMITTER) MISC Replace every 90 days.  (Dexcom G6 Transmitter, #[id number])       dapsone 5 % topical gel Apply topically as needed       doxycycline hyclate (PERIOSTAT) 20 MG tablet Take 1 tablet by mouth 2 times daily with meals       fludrocortisone (FLORINEF) 0.1 MG tablet TAKE 1 TABLET DAILY 90 tablet 1     FLUoxetine (PROZAC) 40 MG capsule Take 120 mg by mouth       ibuprofen (ADVIL/MOTRIN) 200 MG tablet Take 800 mg by mouth       insulin aspart (FIASP) 100 UNIT/ML vial 40-70 units in insulin pump       Insulin Disposable Pump (OMNIPOD 5 PACK) MISC Change every 2 to 3 days.       levonorgestrel (MIRENA) 20 MCG/24HR IUD 1 each by Intrauterine route       levothyroxine (SYNTHROID/LEVOTHROID) 175 MCG tablet Take 175 mcg by mouth every 48 hours       levothyroxine (SYNTHROID/LEVOTHROID) 200 MCG tablet Take 200 mcg by mouth       lisdexamfetamine (VYVANSE) 20 MG capsule The patient takes 40 mg in the AM and 20 mg at noon.       magnesium 200 MG TABS        midodrine (PROAMATINE) 5 MG tablet TAKE 1 TABLET TWICE A  tablet 0     Multiple Vitamin (DAILY-BROCK) TABS Take 1 tablet by mouth       polyethylene glycol (MIRALAX) 17 GM/Dose powder Take 17 g by mouth daily       QUEtiapine (SEROQUEL) 25 MG tablet Take 2 tablets by mouth every morning and 3 tablets at bedtime       tretinoin (RETIN-A) 0.025 % external cream        ubrogepant (UBRELVY) 50 MG tablet 1-2 tab at onset of migraine, may repeat in 2 hours, max 2 dose per day, 2 day/week, 8 day/month.         PAST SURGICAL HISTORY:  Past Surgical History:   Procedure Laterality Date     EP STUDY TILT TABLE N/A 8/4/2021    Procedure: EP TILT TABLE;  Surgeon: Julio Stone MD;   "Location:  HEART CARDIAC CATH LAB       ALLERGIES:     Allergies   Allergen Reactions     Oxcarbazepine Unknown and Rash       FAMILY HISTORY:  No family history on file.    SOCIAL HISTORY:  Social History     Tobacco Use     Smoking status: Never     Smokeless tobacco: Never       ROS:   Constitutional: No fever, chills, or sweats. Weight stable.   ENT: No visual disturbance, ear ache, epistaxis, sore throat.   Cardiovascular: As per HPI.   Respiratory: No cough, hemoptysis.    GI: No nausea, vomiting,   : No hematuria.   Integument: Negative.   Psychiatric: Negative.   Hematologic:  no easy bleeding.  Neuro: Negative.   Endocrinology: No significant heat or cold intolerance   Musculoskeletal: No myalgia.    Exam:  /80 (BP Location: Right arm, Patient Position: Chair, Cuff Size: Adult Regular)   Pulse 80   Ht 1.676 m (5' 6\")   Wt 67.1 kg (148 lb)   SpO2 100%   BMI 23.89 kg/m    GENERAL APPEARANCE: healthy, alert and no distress  HEENT: no icterus,st, no central cyanosis  NECK: no adenopathy, no asymmetry, masses, or scars, thyroid normal to palpation and no bruits, JVP not elevated  RESPIRATORY no rales, rhonchi or wheezes, no use of accessory muscles, no retractions, respirations are unlabored, normal respiratory rate  CARDIOVASCULAR: regular rate rhythm, I.  ABDOMEN: soft, non tender, o masses palpable, bowel sounds normal, aorta not enlarged by palpation, no abdominal bruits  EXTREMITIES: peripheral pulses normal, no edema, no bruits  NEURO: alert and oriented to person/place/time, normal speech, gait and affect  SKIN: no ecchymoses, no rashes    Labs:  CBC RESULTS:   Lab Results   Component Value Date    WBC 8.1 07/15/2021    RBC 4.42 07/15/2021    HGB 13.4 07/15/2021    HCT 40.3 07/15/2021    MCV 91 07/15/2021    MCH 30.3 07/15/2021    MCHC 33.3 07/15/2021    RDW 11.8 07/15/2021     07/15/2021       BMP RESULTS:  Lab Results   Component Value Date     07/15/2021    POTASSIUM 4.1 " 07/15/2021    CHLORIDE 105 07/15/2021    CO2 29 07/15/2021    ANIONGAP 2 (L) 07/15/2021     (H) 07/15/2021    BUN 18 07/15/2021    CR 0.70 07/15/2021    GFRESTIMATED >90 07/15/2021    TERE 8.8 07/15/2021        INR RESULTS:  No results found for: INR    Procedures:  PULMONARY FUNCTION TESTS:   No flowsheet data found.      ECHOCARDIOGRAM:   No results found for this or any previous visit (from the past 8760 hour(s)).      Assessment and Plan:   1.  Autonomic dysfunction with good exercise tolerance and some upper heart rate limitation.  No intervention recommended apart from current medications.    Plan  1.  Continue current drug therapy  2.  Follow-up clinic visit in 2 years or earlier if symptoms change    Total elapsed time today with chart review, clinic visit and documentation 40 minutes    I very much appreciated the opportunity to see and assess Janis Sheehan in the clinic today    Julio Stone MD  Cardiac Arrhythmia Service  [facility]  [phone]      CC  JULIO STONE

## 2022-11-01 NOTE — NURSING NOTE
Chief Complaint   Patient presents with     Follow Up     Return EP- over 1 year f/u for OH and OH with exertion     Vitals were taken and medications reconciled.    Adin Brown, EMT  3:09 PM      no

## 2022-12-18 DIAGNOSIS — G90.9 AUTONOMIC DYSFUNCTION: ICD-10-CM

## 2022-12-21 RX ORDER — MIDODRINE HYDROCHLORIDE 5 MG/1
TABLET ORAL
Qty: 180 TABLET | Refills: 3 | OUTPATIENT
Start: 2022-12-21

## 2023-02-25 ENCOUNTER — MYC MEDICAL ADVICE (OUTPATIENT)
Dept: CARDIOLOGY | Facility: CLINIC | Age: 43
End: 2023-02-25

## 2023-02-25 DIAGNOSIS — G90.9 AUTONOMIC DYSFUNCTION: ICD-10-CM

## 2023-03-02 RX ORDER — MIDODRINE HYDROCHLORIDE 5 MG/1
TABLET ORAL
Qty: 180 TABLET | Refills: 3
Start: 2023-03-02 | End: 2023-04-05

## 2023-04-05 ENCOUNTER — MYC MEDICAL ADVICE (OUTPATIENT)
Dept: CARDIOLOGY | Facility: CLINIC | Age: 43
End: 2023-04-05
Payer: COMMERCIAL

## 2023-04-05 DIAGNOSIS — G90.9 AUTONOMIC DYSFUNCTION: ICD-10-CM

## 2023-04-05 RX ORDER — MIDODRINE HYDROCHLORIDE 5 MG/1
TABLET ORAL
Qty: 180 TABLET | Refills: 3 | Status: SHIPPED | OUTPATIENT
Start: 2023-04-05 | End: 2023-09-12

## 2023-04-27 ENCOUNTER — VIRTUAL VISIT (OUTPATIENT)
Dept: CARDIOLOGY | Facility: CLINIC | Age: 43
End: 2023-04-27
Attending: INTERNAL MEDICINE
Payer: COMMERCIAL

## 2023-04-27 DIAGNOSIS — I95.1 ORTHOSTATIC HYPOTENSION: ICD-10-CM

## 2023-04-27 DIAGNOSIS — G90.9 AUTONOMIC DYSFUNCTION: Primary | ICD-10-CM

## 2023-04-27 DIAGNOSIS — I73.00 RAYNAUD'S DISEASE WITHOUT GANGRENE: ICD-10-CM

## 2023-04-27 PROCEDURE — 99215 OFFICE O/P EST HI 40 MIN: CPT | Mod: VID | Performed by: INTERNAL MEDICINE

## 2023-04-27 ASSESSMENT — PATIENT HEALTH QUESTIONNAIRE - PHQ9
10. IF YOU CHECKED OFF ANY PROBLEMS, HOW DIFFICULT HAVE THESE PROBLEMS MADE IT FOR YOU TO DO YOUR WORK, TAKE CARE OF THINGS AT HOME, OR GET ALONG WITH OTHER PEOPLE: SOMEWHAT DIFFICULT
SUM OF ALL RESPONSES TO PHQ QUESTIONS 1-9: 16
SUM OF ALL RESPONSES TO PHQ QUESTIONS 1-9: 16

## 2023-04-27 NOTE — LETTER
4/27/2023      RE: Janis Sheehan  [id number]  [facility] [address]       Dear Colleague,    Thank you for the opportunity to participate in the care of your patient, Janis Sheehan, at the [facility] [place] at [facility]. Please see a copy of my visit note below.    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  Janis is very active, primarily as a bicyclist doing long distance biking-but not so much competitively as for her overall fitness.    Janis is a diabetic and has evidence of autonomic neuropathy based on studies carried out at [facility] previously.  She moved to [place] for short time but has returned to [place] to continue her work and pursue her exercise interest.  In regard to the latter, Janis's exercises at a level that is quite commendable and probably puts her in an upper bracket and fitness for general individuals.  Nonetheless she has noted some deterioration in her exercise capacity over the past year or so.      She had a number of concerns which we addressed during this visit and are summarized as follows:  1.  Janis has noted some chronotropic limitation.  She wears a Fitbit and uses an Apple Watch.  During long rides she has noted heart rates maxing out and around 130 although on occasion the Apple Watch will say 160.  Likely the latter was due to premature beats rather than sustained epochs of high heart rate.  In addition, she feels as though she is not getting enough blood supply to her lungs.  Although she is not complaining of peripheral ischemic symptoms she does have loss of vision at times during heavy exercise and this may be indicative of diminished cerebral/retinal blood flow.  She also has coldness of her hands and feet which may similarly reflect inadequate peripheral blood  "flow.  I recommended that we undertake a bicycle cardiopulmonary exercise test to try and determine how much of Janis's limitation may be chronotropic incompetence.    During recent bicycle ride, upon returning home Janis noted that her heart rate went up late was in the 130s and her blood pressure was in the 80s systolic.  After lying down her heart rate quickly returned to the 60s and her blood pressure reid.  When she stood up again the reverse occurred.  It is highly likely that there may be a volume depletion component in Janis's situation and that she needs to focus on volume loading and electrolyte fluids prior to  during bike riding events.    Nutrition is an important element.  Janis will be visiting with nutritionist later this month to obtain advice regarding volume management and nutritional management. Does have a past history of eating disorder.  She may be under nourished at this stage given the vigorous nature of her exercise    2.  Janis has noted periods of unexpected thirst.  In this regard it is unclear whether she is not taking enough volume or whether she may be experiencing another condition such as Sjogren's syndrome.  In no recent ophthalmologic evaluation she was told that she also exhibited \"dry eye\".  Endocrine evaluation may be helpful in this regard.    3.  As noted above Janis does complain of her hands getting very cold and exhibiting different colors such as purple-red and white.  She had similar symptoms prior to our initial evaluation and they did seem to improve with treatment.  However she is worried that there recurrence may reflect worsening of her overall autonomic status.    4.  Janis raised the issue of withdrawing her exercise level.  I indicated that that may become necessary but that we should explore some remediable elements first.  In this regard we agreed that we would do the following:  A) undertake cardiopulmonary exercise test with bicycle exercise  B) have her wear a " Holter monitor during her next long ride to get a better idea of where her heart rates are peaking    In terms of repeating autonomic studies I think we can delay that for the time being as the other evaluations (A and B above) we are more likely to provide useful information.  Patient voiced understanding and agreement.    PAST MEDICAL HISTORY:  No past medical history on file.    CURRENT MEDICATIONS:  Current Outpatient Medications   Medication Sig Dispense Refill    atorvastatin (LIPITOR) 10 MG tablet Take 20 mg by mouth daily      blood glucose (NO BRAND SPECIFIED) test strip Use 1 strip to test blood glucose 5 times a day.      blood glucose calibration (FREESTYLE CONTROL) solution To calibrate meter      clonazePAM (KLONOPIN) 0.5 MG tablet Take 1 tablet by mouth every morning, take 1 and 1/2 tablets every night at bedtime and additional 1 tablet as needed daily for anxiety.      coenzyme Q-10 10 MG CAPS Take 400 mg by mouth      Continuous Blood Gluc Sensor (DEXCOM G6 SENSOR) MISC Change every 10 days.  (Dexcom G6 Glucose Sensor, #[id number]-003      Continuous Blood Gluc Transmit (DEXCOM G6 TRANSMITTER) MISC Replace every 90 days.  (Dexcom G6 Transmitter, #[id number]-002)      dapsone 5 % topical gel Apply topically as needed      doxycycline hyclate (PERIOSTAT) 20 MG tablet Take 1 tablet by mouth 2 times daily with meals      fludrocortisone (FLORINEF) 0.1 MG tablet Take 1 tablet (0.1 mg) by mouth daily 90 tablet 3    FLUoxetine (PROZAC) 40 MG capsule Take 120 mg by mouth      ibuprofen (ADVIL/MOTRIN) 200 MG tablet Take 800 mg by mouth      insulin aspart (FIASP) 100 UNIT/ML vial 40-70 units in insulin pump      Insulin Disposable Pump (OMNIPOD 5 PACK) MISC Change every 2 to 3 days.      levonorgestrel (MIRENA) 20 MCG/24HR IUD 1 each by Intrauterine route      levothyroxine (SYNTHROID/LEVOTHROID) 175 MCG tablet Take 175 mcg by mouth every 48 hours      levothyroxine (SYNTHROID/LEVOTHROID) 200 MCG tablet Take 200 mcg  by mouth      lisdexamfetamine (VYVANSE) 20 MG capsule The patient takes 40 mg in the AM and 20 mg at noon.      magnesium 200 MG TABS       midodrine (PROAMATINE) 5 MG tablet Take 10mg in the morning and 5 mg in the afternoon. Not to be taken within 4 hours of lying down. 180 tablet 3    Multiple Vitamin (DAILY-BROCK) TABS Take 1 tablet by mouth      polyethylene glycol (MIRALAX) 17 GM/Dose powder Take 17 g by mouth daily      QUEtiapine (SEROQUEL) 25 MG tablet Take 2 tablets by mouth every morning and 3 tablets at bedtime      tretinoin (RETIN-A) 0.025 % external cream       ubrogepant (UBRELVY) 50 MG tablet 1-2 tab at onset of migraine, may repeat in 2 hours, max 2 dose per day, 2 day/week, 8 day/month.         PAST SURGICAL HISTORY:  Past Surgical History:   Procedure Laterality Date    EP STUDY TILT TABLE N/A 8/4/2021    Procedure: EP TILT TABLE;  Surgeon: Julio Stone MD;  Location:  HEART CARDIAC CATH LAB       ALLERGIES:     Allergies   Allergen Reactions    Oxcarbazepine Unknown and Rash       FAMILY HISTORY:  History reviewed but not pertinent    SOCIAL HISTORY:  Social History     Tobacco Use    Smoking status: Never    Smokeless tobacco: Never       ROS:   Constitutional: No fever, chills, or sweats. Weight stable.   ENT: No visual disturbance, ear ache, epistaxis, sore throat.   Cardiovascular: As per HPI.   Respiratory: No cough, hemoptysis.    GI: No nausea, vomiting,    : No hematuria.   Integument: Negative.   Psychiatric: Negative.   Hematologic:   no easy bleeding.  Neuro: Negative.   Endocrinology: No significant heat or cold intolerance   Musculoskeletal: No myalgia or other rheumatologic complaints apart from those noted in HPI.    Exam:  There were no vitals taken for this visit.  GENERAL APPEARANCE: healthy, alert and no distress  HEENT: no icterus, no xanthelasmas, normal pupil size and reaction, normal palate, mucosa moist, no central cyanosis  NECK: no adenopathy, no asymmetry,  masses, or scars, thyroid normal to palpation and no bruits, JVP not elevated  RESPIRATORY:  no rales, rhonchi or wheezes, no use of accessory muscles, no retractions, respirations are unlabored, normal respiratory rate  NEURO: alert and oriented to person/place/time, normal speech, gait and affect  SKIN: no ecchymoses, no rashes but probable inadequate blood flow to peripheral vessels during heavy exercise    Labs:  CBC RESULTS:   Lab Results   Component Value Date    WBC 8.1 07/15/2021    RBC 4.42 07/15/2021    HGB 13.4 07/15/2021    HCT 40.3 07/15/2021    MCV 91 07/15/2021    MCH 30.3 07/15/2021    MCHC 33.3 07/15/2021    RDW 11.8 07/15/2021     07/15/2021       BMP RESULTS:  Lab Results   Component Value Date     07/15/2021    POTASSIUM 4.1 07/15/2021    CHLORIDE 105 07/15/2021    CO2 29 07/15/2021    ANIONGAP 2 (L) 07/15/2021     (H) 07/15/2021    BUN 18 07/15/2021    CR 0.70 07/15/2021    GFRESTIMATED >90 07/15/2021    TERE 8.8 07/15/2021        INR RESULTS:  No results found for: INR    Procedures:  PULMONARY FUNCTION TESTS:        View : No data to display.                  ECHOCARDIOGRAM:   No results found for this or any previous visit (from the past 8760 hour(s)).      Assessment and Plan:   1.  High level of fitness despite underlying diabetes and autonomic dysfunction  2.  Possible chronotropic incompetence and peripheral vascular circulatory limitation during exercise  3.  Possible Raynaud's phenomenon  4.  Thirst and dry eyes-should be evaluated for Sjogren's syndrome    Plan  1.  Arrange for patient to have a cardiopulmonary exercise test using bicycle exercise (request that Dr. Santos review data)  2.  Provide 48-hour Holter for patient to wear during a bicycle riding event-obtain recording so that I can ascertain her maximum heart rates  3.  Patient advised to seek endocrine consultation regarding possible Sjogren's syndrome  4.  Patient is advised that she should continue her  exercise but listen carefully to her body for symptoms of visual impairment or hearing impairment as these could lead to danger during the bicycle events with multiple riders and/or collapse during the writing.  5.  Follow-up clinic visit by video after #1 and #2 above    Total elapsed time for this visit today for chart review, visit and documentation-60 minutes    Video on 5: 05; video off 5: 50  Platform [employer]  Patient at home work;  clinic [facility] heart    I very much appreciated the opportunity to see and assess Dr Janis Sheehan in the clinic today. Please do not hesitate to contact my office if you have any questions or concerns.      Julio Stone MD  Cardiac Arrhythmia Service  [facility]  [phone]

## 2023-04-27 NOTE — PROGRESS NOTES
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  Janis is very active, primarily as a bicyclist doing long distance biking-but not so much competitively as for her overall fitness.    Janis is a diabetic and has evidence of autonomic neuropathy based on studies carried out at [facility] previously.  She moved to [place] for short time but has returned to [place] to continue her work and pursue her exercise interest.  In regard to the latter, Janis's exercises at a level that is quite commendable and probably puts her in an upper bracket and fitness for general individuals.  Nonetheless she has noted some deterioration in her exercise capacity over the past year or so.      She had a number of concerns which we addressed during this visit and are summarized as follows:  1.  Janis has noted some chronotropic limitation.  She wears a Fitbit and uses an Apple Watch.  During long rides she has noted heart rates maxing out and around 130 although on occasion the Apple Watch will say 160.  Likely the latter was due to premature beats rather than sustained epochs of high heart rate.  In addition, she feels as though she is not getting enough blood supply to her lungs.  Although she is not complaining of peripheral ischemic symptoms she does have loss of vision at times during heavy exercise and this may be indicative of diminished cerebral/retinal blood flow.  She also has coldness of her hands and feet which may similarly reflect inadequate peripheral blood flow.  I recommended that we undertake a bicycle cardiopulmonary exercise test to try and determine how much of Janis's limitation may be chronotropic incompetence.    During recent bicycle ride, upon returning home Janis noted that her heart rate went up late was in the 130s and her blood pressure was in the 80s systolic.  After lying down her  "heart rate quickly returned to the 60s and her blood pressure reid.  When she stood up again the reverse occurred.  It is highly likely that there may be a volume depletion component in Janis's situation and that she needs to focus on volume loading and electrolyte fluids prior to  during bike riding events.    Nutrition is an important element.  Janis will be visiting with nutritionist later this month to obtain advice regarding volume management and nutritional management. Does have a past history of eating disorder.  She may be under nourished at this stage given the vigorous nature of her exercise    2.  Janis has noted periods of unexpected thirst.  In this regard it is unclear whether she is not taking enough volume or whether she may be experiencing another condition such as Sjogren's syndrome.  In no recent ophthalmologic evaluation she was told that she also exhibited \"dry eye\".  Endocrine evaluation may be helpful in this regard.    3.  As noted above Janis does complain of her hands getting very cold and exhibiting different colors such as purple-red and white.  She had similar symptoms prior to our initial evaluation and they did seem to improve with treatment.  However she is worried that there recurrence may reflect worsening of her overall autonomic status.    4.  Janis raised the issue of withdrawing her exercise level.  I indicated that that may become necessary but that we should explore some remediable elements first.  In this regard we agreed that we would do the following:  A) undertake cardiopulmonary exercise test with bicycle exercise  B) have her wear a Holter monitor during her next long ride to get a better idea of where her heart rates are peaking    In terms of repeating autonomic studies I think we can delay that for the time being as the other evaluations (A and B above) we are more likely to provide useful information.  Patient voiced understanding and agreement.    PAST MEDICAL " HISTORY:  No past medical history on file.    CURRENT MEDICATIONS:  Current Outpatient Medications   Medication Sig Dispense Refill     atorvastatin (LIPITOR) 10 MG tablet Take 20 mg by mouth daily       blood glucose (NO BRAND SPECIFIED) test strip Use 1 strip to test blood glucose 5 times a day.       blood glucose calibration (FREESTYLE CONTROL) solution To calibrate meter       clonazePAM (KLONOPIN) 0.5 MG tablet Take 1 tablet by mouth every morning, take 1 and 1/2 tablets every night at bedtime and additional 1 tablet as needed daily for anxiety.       coenzyme Q-10 10 MG CAPS Take 400 mg by mouth       Continuous Blood Gluc Sensor (DEXCOM G6 SENSOR) MISC Change every 10 days.  (Dexcom G6 Glucose Sensor, #[id number]-[id number]       Continuous Blood Gluc Transmit (DEXCOM G6 TRANSMITTER) MISC Replace every 90 days.  (Dexcom G6 Transmitter, #STT-OE-002)       dapsone 5 % topical gel Apply topically as needed       doxycycline hyclate (PERIOSTAT) 20 MG tablet Take 1 tablet by mouth 2 times daily with meals       fludrocortisone (FLORINEF) 0.1 MG tablet Take 1 tablet (0.1 mg) by mouth daily 90 tablet 3     FLUoxetine (PROZAC) 40 MG capsule Take 120 mg by mouth       ibuprofen (ADVIL/MOTRIN) 200 MG tablet Take 800 mg by mouth       insulin aspart (FIASP) 100 UNIT/ML vial 40-70 units in insulin pump       Insulin Disposable Pump (OMNIPOD 5 PACK) MISC Change every 2 to 3 days.       levonorgestrel (MIRENA) 20 MCG/24HR IUD 1 each by Intrauterine route       levothyroxine (SYNTHROID/LEVOTHROID) 175 MCG tablet Take 175 mcg by mouth every 48 hours       levothyroxine (SYNTHROID/LEVOTHROID) 200 MCG tablet Take 200 mcg by mouth       lisdexamfetamine (VYVANSE) 20 MG capsule The patient takes 40 mg in the AM and 20 mg at noon.       magnesium 200 MG TABS        midodrine (PROAMATINE) 5 MG tablet Take 10mg in the morning and 5 mg in the afternoon. Not to be taken within 4 hours of lying down. 180 tablet 3     Multiple Vitamin  (DAILY-BROCK) TABS Take 1 tablet by mouth       polyethylene glycol (MIRALAX) 17 GM/Dose powder Take 17 g by mouth daily       QUEtiapine (SEROQUEL) 25 MG tablet Take 2 tablets by mouth every morning and 3 tablets at bedtime       tretinoin (RETIN-A) 0.025 % external cream        ubrogepant (UBRELVY) 50 MG tablet 1-2 tab at onset of migraine, may repeat in 2 hours, max 2 dose per day, 2 day/week, 8 day/month.         PAST SURGICAL HISTORY:  Past Surgical History:   Procedure Laterality Date     EP STUDY TILT TABLE N/A 8/4/2021    Procedure: EP TILT TABLE;  Surgeon: Julio Stone MD;  Location:  HEART CARDIAC CATH LAB       ALLERGIES:     Allergies   Allergen Reactions     Oxcarbazepine Unknown and Rash       FAMILY HISTORY:  History reviewed but not pertinent    SOCIAL HISTORY:  Social History     Tobacco Use     Smoking status: Never     Smokeless tobacco: Never       ROS:   Constitutional: No fever, chills, or sweats. Weight stable.   ENT: No visual disturbance, ear ache, epistaxis, sore throat.   Cardiovascular: As per HPI.   Respiratory: No cough, hemoptysis.    GI: No nausea, vomiting,    : No hematuria.   Integument: Negative.   Psychiatric: Negative.   Hematologic:   no easy bleeding.  Neuro: Negative.   Endocrinology: No significant heat or cold intolerance   Musculoskeletal: No myalgia or other rheumatologic complaints apart from those noted in HPI.    Exam:  There were no vitals taken for this visit.  GENERAL APPEARANCE: healthy, alert and no distress  HEENT: no icterus, no xanthelasmas, normal pupil size and reaction, normal palate, mucosa moist, no central cyanosis  NECK: no adenopathy, no asymmetry, masses, or scars, thyroid normal to palpation and no bruits, JVP not elevated  RESPIRATORY:  no rales, rhonchi or wheezes, no use of accessory muscles, no retractions, respirations are unlabored, normal respiratory rate  NEURO: alert and oriented to person/place/time, normal speech, gait and  affect  SKIN: no ecchymoses, no rashes but probable inadequate blood flow to peripheral vessels during heavy exercise    Labs:  CBC RESULTS:   Lab Results   Component Value Date    WBC 8.1 07/15/2021    RBC 4.42 07/15/2021    HGB 13.4 07/15/2021    HCT 40.3 07/15/2021    MCV 91 07/15/2021    MCH 30.3 07/15/2021    MCHC 33.3 07/15/2021    RDW 11.8 07/15/2021     07/15/2021       BMP RESULTS:  Lab Results   Component Value Date     07/15/2021    POTASSIUM 4.1 07/15/2021    CHLORIDE 105 07/15/2021    CO2 29 07/15/2021    ANIONGAP 2 (L) 07/15/2021     (H) 07/15/2021    BUN 18 07/15/2021    CR 0.70 07/15/2021    GFRESTIMATED >90 07/15/2021    TERE 8.8 07/15/2021        INR RESULTS:  No results found for: INR    Procedures:  PULMONARY FUNCTION TESTS:        View : No data to display.                  ECHOCARDIOGRAM:   No results found for this or any previous visit (from the past 8760 hour(s)).      Assessment and Plan:   1.  High level of fitness despite underlying diabetes and autonomic dysfunction  2.  Possible chronotropic incompetence and peripheral vascular circulatory limitation during exercise  3.  Possible Raynaud's phenomenon  4.  Thirst and dry eyes-should be evaluated for Sjogren's syndrome    Plan  1.  Arrange for patient to have a cardiopulmonary exercise test using bicycle exercise (request that Dr. Santos review data)  2.  Provide 48-hour Holter for patient to wear during a bicycle riding event-obtain recording so that I can ascertain her maximum heart rates  3.  Patient advised to seek endocrine consultation regarding possible Sjogren's syndrome  4.  Patient is advised that she should continue her exercise but listen carefully to her body for symptoms of visual impairment or hearing impairment as these could lead to danger during the bicycle events with multiple riders and/or collapse during the writing.  5.  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 Please do not hesitate to contact my office if you have any questions or concerns.      Julio Stone MD  Cardiac Arrhythmia Service  [facility]  [phone]              CC

## 2023-04-27 NOTE — NURSING NOTE
PHQ #9 POSITIVE during e check in, Pt declined speaking to triage nurse today.     Is the patient currently in the state of [place]? YES    Visit mode:VIDEO    If the visit is dropped, the patient can be reconnected by: VIDEO VISIT: Text to cell phone: [phone]    Will anyone else be joining the visit? NO      How would you like to obtain your AVS? MyChart    Are changes needed to the allergy or medication list? NO     Patient declined individual allergy and medication review by support staff because patient denies any changes since echeck-in completion and states all information entered during echeck-in remains accurate.    Reason for visit: Follow Up    ABDIEL Rodrigues/TOMAS

## 2023-05-01 NOTE — PATIENT INSTRUCTIONS
You were seen in the Electrophysiology Clinic today by: Dr Stone    Plan:     Labs/Tests Needed:  Cardiopulmonary bicycle stress test  48 hour holter monitor - to be placed same day but prior to stress test and continue to wear afterwards     Follow up Visit:  After testing as been completed     Further Instructions:  Referal to Endocrinology for possible Sjogren's      If you have further questions, please utilize [employer] to contact us.     Your Care Team:    EP Cardiology   Telephone Number     Nurse Line  Ivory Pinzon, RN   Carrie Becerra RN   [phone]     For scheduling appointments:   Lilian   [phone]   For procedure scheduling:    Chandni Ferro     [phone]   For the Device Clinic (Pacemakers, ICDs, Loop Recorders)    During business hours: [phone]  After business hours:   [phone]- select option 4 and ask for job code [id number].       On-call cardiologist for after hours or on weekends:   [phone], option #4, and ask to speak to the on-call cardiologist.     Cardiovascular Clinic:   [address] [place] [address]. [place] [address]      As always, Thank you for trusting us with your health care needs!

## 2023-05-22 ENCOUNTER — HOSPITAL ENCOUNTER (OUTPATIENT)
Dept: CARDIOLOGY | Facility: CLINIC | Age: 43
Discharge: HOME | End: 2023-05-22
Attending: INTERNAL MEDICINE
Payer: COMMERCIAL

## 2023-05-22 VITALS — WEIGHT: 153.22 LBS | BODY MASS INDEX: 24.73 KG/M2

## 2023-05-22 DIAGNOSIS — I95.1 ORTHOSTATIC HYPOTENSION: ICD-10-CM

## 2023-05-22 DIAGNOSIS — G90.9 AUTONOMIC DYSFUNCTION: ICD-10-CM

## 2023-05-22 PROCEDURE — 94621 CARDIOPULM EXERCISE TESTING: CPT

## 2023-05-22 PROCEDURE — 93227 XTRNL ECG REC<48 HR R&I: CPT | Performed by: INTERNAL MEDICINE

## 2023-05-22 PROCEDURE — 93225 XTRNL ECG REC<48 HRS REC: CPT

## 2023-05-22 PROCEDURE — 94621 CARDIOPULM EXERCISE TESTING: CPT | Mod: 26 | Performed by: INTERNAL MEDICINE

## 2023-05-23 LAB
CARDIOPULMONARY ANAEROBIC THRESHOLD PREDICTED PEAK: 75 %
CARDIOPULMONARY ANAEROBIC THRESHOLD VO2: 23 ML/KG/MIN
CARDIOPULMONARY BREATHING RESERVE REST: 84.4
CARDIOPULMONARY BREATHING RESERVE V02MAX: 43
CARDIOPULMONARY CO2 OUTPUT REST: 427 ML/MIN
CARDIOPULMONARY CO2 OUTPUT VO2MAX: 1956 ML/MIN
CARDIOPULMONARY FEV 1.0 (L) ACTUAL: 3.22
CARDIOPULMONARY FEV 1.0 (L) PRECENT: 101 %
CARDIOPULMONARY FEV 1.0 (L) PREDICTED: 3.19
CARDIOPULMONARY FEV 1.0 FVC (%) ACTUAL: 84.7
CARDIOPULMONARY FEV 1.0 FVC (%) PERCENT: 103 %
CARDIOPULMONARY FEV 1.0 FVC (%) PREDICTED: 82.1
CARDIOPULMONARY FUNCTIONAL CAPACITY MAX ML/KG/MIN: 28.9 ML/KG/MIN
CARDIOPULMONARY FUNCTIONAL CAPACITY PERCENT: 94 %
CARDIOPULMONARY FUNCTIONAL CAPACITY PREDICTED: 30.7 ML/KG/MIN
CARDIOPULMONARY FVC (L) ACTUAL: 3.8
CARDIOPULMONARY FVC (L) PERCENT: 97 %
CARDIOPULMONARY FVC (L) PREDICTED: 3.93
CARDIOPULMONARY MET'S REST: 1.4
CARDIOPULMONARY MINUTE VENTILATION REST: 17.6 L/MIN
CARDIOPULMONARY MINUTE VENTILATION VO2MAX: 64.2 L/MIN
CARDIOPULMONARY MYOCARDIAC O2 DEMAND MAX: NORMAL
CARDIOPULMONARY OXYGEN CONSUMPTION REST: 4.9 ML/KG/MIN
CARDIOPULMONARY OXYGEN CONSUMPTION VO2MAX: 28.9 ML/KG/MIN
CARDIOPULMONARY OXYGEN PULSE REST: 5 ML/BEAT
CARDIOPULMONARY OXYGEN PULSE VO2MAX: 14.9 ML/BEAT
CARDIOPULMONARY OXYGEN SATURATION- OXIMETRY REST: 100 %
CARDIOPULMONARY OXYGEN SATURATION- OXIMETRY VO2MAX: 100 %
CARDIOPULMONARY PET C02 REST: 28
CARDIOPULMONARY PET C02 VO2MAX: 35
CARDIOPULMONARY PET02 REST: 123
CARDIOPULMONARY PET02 V02 MAX: 109
CARDIOPULMONARY RER: 0.95
CARDIOPULMONARY RESPIRALORY EXCHANGE RATIO VO2MAX: 0.95
CARDIOPULMONARY RESPIRALORY EXCHANGE RATIO: 1.26
CARDIOPULMONARY RESPIRATORY RATE REST: 14 BR/MIN
CARDIOPULMONARY RESPIRATORY RATE VO2MAX: 41 BR/MIN
CARDIOPULMONARY STRESS BASE 1 BP MMHG: NORMAL MMHG
CARDIOPULMONARY STRESS BASE 1 BPA: 117 BPM
CARDIOPULMONARY STRESS BASE 1 SPO2: 100 % SPO2
CARDIOPULMONARY STRESS BASE 1 TIME SEC: 0 SEC
CARDIOPULMONARY STRESS BASE 1 TIME: 1 MINS
CARDIOPULMONARY STRESS BASE 2 BP MMHG: NORMAL MMHG
CARDIOPULMONARY STRESS BASE 2 BPA: 96 BPM
CARDIOPULMONARY STRESS BASE 2 SPO2: 100 % SPO2
CARDIOPULMONARY STRESS BASE 2 TIME SEC: 0 SEC
CARDIOPULMONARY STRESS BASE 2 TIME: 3 MINS
CARDIOPULMONARY STRESS BASE 3 BP MMHG: NORMAL MMHG
CARDIOPULMONARY STRESS BASE 3 BPA: 93 BPM
CARDIOPULMONARY STRESS BASE 3 SPO2: 100 % SPO2
CARDIOPULMONARY STRESS BASE 3 TIME SEC: 0 SEC
CARDIOPULMONARY STRESS BASE 3 TIME: 5 MINS
CARDIOPULMONARY STRESS PHASE 1 BP MMHG: NORMAL MMHG
CARDIOPULMONARY STRESS PHASE 1 BPM: 103 BPM
CARDIOPULMONARY STRESS PHASE 1 SPO2: 100 % SPO2
CARDIOPULMONARY STRESS PHASE 1 TIME SEC: 0 SEC
CARDIOPULMONARY STRESS PHASE 1 TIME: 2 MINS
CARDIOPULMONARY STRESS PHASE 2 BP MMHG: NORMAL MMHG
CARDIOPULMONARY STRESS PHASE 2 BPM: 119 BPM
CARDIOPULMONARY STRESS PHASE 2 SPO2: 100 % SPO2
CARDIOPULMONARY STRESS PHASE 2 TIME SEC: 0 SEC
CARDIOPULMONARY STRESS PHASE 2 TIME: 4 MINS
CARDIOPULMONARY STRESS PHASE 3 BP MMHG: NORMAL MMHG
CARDIOPULMONARY STRESS PHASE 3 BPM: 122 BPM
CARDIOPULMONARY STRESS PHASE 3 SPO2: 100 % SPO2
CARDIOPULMONARY STRESS PHASE 3 TIME SEC: 0 SEC
CARDIOPULMONARY STRESS PHASE 3 TIME: 6 MINS
CARDIOPULMONARY STRESS PHASE 4 BP MMHG: NORMAL MMHG
CARDIOPULMONARY STRESS PHASE 4 BPM: 126 BPM
CARDIOPULMONARY STRESS PHASE 4 SPO2: 100 % SPO2
CARDIOPULMONARY STRESS PHASE 4 TIME SEC: 0 SEC
CARDIOPULMONARY STRESS PHASE 4 TIME: 8 MINS
CARDIOPULMONARY STRESS PHASE 5 BP MMHG: NORMAL MMHG
CARDIOPULMONARY STRESS PHASE 5 BPM: 130 BPM
CARDIOPULMONARY STRESS PHASE 5 SPO2: 100 % SPO2
CARDIOPULMONARY STRESS PHASE 5 TIME SEC: 0 SEC
CARDIOPULMONARY STRESS PHASE 5 TIME: 10 MINS
CARDIOPULMONARY STRESS PHASE 6 BP MMHG: NORMAL MMHG
CARDIOPULMONARY STRESS PHASE 6 BPA: 135 BPM
CARDIOPULMONARY STRESS PHASE 6 SPO2: 100 % SPO2
CARDIOPULMONARY STRESS PHASE 6 TIME SEC: 40 SEC
CARDIOPULMONARY STRESS PHASE 6 TIME: 11 MINS
CARDIOPULMONARY SVC (L) ACTUAL: 3.78
CARDIOPULMONARY SVC (L) PERCENT: 96 %
CARDIOPULMONARY SVC (L) PREDICTED: 3.93
CARDIOPULMONARY TIDAL VOLUME REST: 1263 ML
CARDIOPULMONARY TIDAL VOLUME VO2MAX: 1552 ML
CARDIOPULMONARY VE/VCO2 SLOPE: 30.56
CARDIOPULMONARY VENTILATORY EQUIVALENT 02 REST: 52
CARDIOPULMONARY VENTILATORY EQUIVALENT 02 V02: 31
CARDIOPULMONARY VENTILATORY EQUIVALENT C02 REST: 41
CARDIOPULMONARY VENTILATORY EQUIVALENT C02 SLOPE VO2MAX: 30.56
CARDIOPULMONARY VENTILATORY EQUIVALENT C02 VO2MAX: 33
CV STRESS MAX HR HE: 135
PREDICTED VO2MAX: 30.7
STRESS ANGINA INDEX: 0
STRESS ECHO BASELINE BP: NORMAL MMHG
STRESS ECHO BASELINE HR: 62 BPM
STRESS ECHO CALCULATED PERCENT HR: 75 %
STRESS ECHO LAST STRESS BP: NORMAL MMHG
STRESS ECHO POST ESTIMATED WORKLOAD: 8.3 METS
STRESS ECHO POST EXERCISE DUR MIN: 11 MIN
STRESS ECHO POST EXERCISE DUR SEC: 40 SEC
STRESS ECHO TARGET HR: 179

## 2023-05-24 ENCOUNTER — MYC MEDICAL ADVICE (OUTPATIENT)
Dept: CARDIOLOGY | Facility: CLINIC | Age: 43
End: 2023-05-24
Payer: COMMERCIAL

## 2023-05-27 DIAGNOSIS — G90.9 AUTONOMIC DYSFUNCTION: ICD-10-CM

## 2023-06-01 ENCOUNTER — VIRTUAL VISIT (OUTPATIENT)
Dept: CARDIOLOGY | Facility: CLINIC | Age: 43
End: 2023-06-01
Attending: INTERNAL MEDICINE
Payer: COMMERCIAL

## 2023-06-01 DIAGNOSIS — I95.1 ORTHOSTATIC HYPOTENSION: ICD-10-CM

## 2023-06-01 DIAGNOSIS — G90.9 AUTONOMIC DYSFUNCTION: ICD-10-CM

## 2023-06-01 PROCEDURE — 99215 OFFICE O/P EST HI 40 MIN: CPT | Mod: 95 | Performed by: INTERNAL MEDICINE

## 2023-06-01 RX ORDER — MIDODRINE HYDROCHLORIDE 5 MG/1
TABLET ORAL
Qty: 180 TABLET | Refills: 3 | OUTPATIENT
Start: 2023-06-01

## 2023-06-01 NOTE — LETTER
6/1/2023      RE: Janis Sheehan  [address]  [facility] [address]       Dear Colleague,    Thank you for the opportunity to participate in the care of your patient, Janis Sheehan, at the [facility] [place] [facility]. Please see a copy of my visit note below.    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  She has been very active bicycling and noted that performance is improved and she is very conscientious about taking in salt and volume.      Her diabetes is an issue from a chronotropic perspective and during her recent cardiopulmonary stress test heart rate maxed out at 135 bpm (see summary below).  She uses a Fitbit and indicates that it is rare that her spontaneous heart rate during exercise will be in the 140-145 range.  In the past she has noted herself to be hypotensive at the end of races.  With increased hydration this seems to be improving.    She believes that she requires more volume during writing than her colleagues.  During a recent 100 mile ride she consumed over 5 L of fluid which is probably not an excessive amount but she thought it was more than the average.  Additionally, she notes that when she is riding she is usually standing and this may contribute to.'s of lightheadedness with visual loss and hearing loss when vigorously exercising.  Potentially there is transfer of cardiac output to the working muscles and inadequate flow to the head.  I suggested that she try horizontal riding for part of the time to see if that might give her symptoms.    During her last 100 mile ride her blood sugar ranged from .  She has been is using conventional electrolyte drinks with some sugar such as Gatorade.  Other times she supplements her sodium and electrolyte intake using 4 packets of electrolyte supplement  which gives  her approximately 1 g of salt.  I encouraged her to continue to be focused on aggressive hydration.  This does present a logistical problem during long bike rides but nevertheless has proven effective for Janis in improving her performance in reducing her risk of falls and injury.    It should be noted that the cardiopulmonary test was undertaken in the recumbent position which proved difficult for Janis as it is not typical of the way she rides her bike..    C-P Stress Test  A cardiopulmonary stress test was performed following a bicycle protocol with the patient exercising for 11 minutes and 40 seconds under the supervision of Dr. Briscoe.  Blood pressure demonstrated a normal response to exercise.  Heart rate demonstrated a blunted response to exercise.    The stress electrocardiogram is negative for ischemia at the heart rate achieved.  The negative predictive value of the examination is reduced due to inability to achieve target HR.    There is no prior study for comparison.    The patient's exercise capacity is average.  Max HR was 135/min   (vs expected 80% max approx 160/min)    We agreed at the end of this visit that she should continue her exercise and focus on finding means to provide adequate hydration and electrolyte intake during her rides.  I also suggested that she could check with colleagues to determine whether her fluid intake is indeed substantially more than theirs.  It seems likely that she is in the right ballpark for volume intake during competition.  We also discussed that her diabetes may cause some chronotropic limitation which would need to be compensated for by more muscle activity and requiring more fluid volume.  It may be useful for her to be seen by exercise physiologist in this regard.  We will check to see if there are any such individuals available at [place].  In the meantime we will follow-up in several months to ascertain her progress.    PAST MEDICAL HISTORY:  No past  medical history on file.    CURRENT MEDICATIONS:  Current Outpatient Medications   Medication Sig Dispense Refill    atorvastatin (LIPITOR) 10 MG tablet Take 20 mg by mouth daily      blood glucose (NO BRAND SPECIFIED) test strip Use 1 strip to test blood glucose 5 times a day.      blood glucose calibration (FREESTYLE CONTROL) solution To calibrate meter      clonazePAM (KLONOPIN) 0.5 MG tablet Take 1 tablet by mouth every morning, take 1 and 1/2 tablets every night at bedtime and additional 1 tablet as needed daily for anxiety.      coenzyme Q-10 10 MG CAPS Take 400 mg by mouth      Continuous Blood Gluc Sensor (DEXCOM G6 SENSOR) MISC Change every 10 days.  (Dexcom G6 Glucose Sensor, #[id number]-003      Continuous Blood Gluc Transmit (DEXCOM G6 TRANSMITTER) MISC Replace every 90 days.  (Dexcom G6 Transmitter, #[id number])      dapsone 5 % topical gel Apply topically as needed      doxycycline hyclate (PERIOSTAT) 20 MG tablet Take 1 tablet by mouth 2 times daily with meals      fludrocortisone (FLORINEF) 0.1 MG tablet Take 1 tablet (0.1 mg) by mouth daily 90 tablet 3    FLUoxetine (PROZAC) 40 MG capsule Take 120 mg by mouth      ibuprofen (ADVIL/MOTRIN) 200 MG tablet Take 800 mg by mouth      insulin aspart (FIASP) 100 UNIT/ML vial 40-70 units in insulin pump      Insulin Disposable Pump (OMNIPOD 5 PACK) MISC Change every 2 to 3 days.      levonorgestrel (MIRENA) 20 MCG/24HR IUD 1 each by Intrauterine route      levothyroxine (SYNTHROID/LEVOTHROID) 175 MCG tablet Take 175 mcg by mouth every 48 hours      levothyroxine (SYNTHROID/LEVOTHROID) 200 MCG tablet Take 200 mcg by mouth      lisdexamfetamine (VYVANSE) 20 MG capsule The patient takes 40 mg in the AM and 20 mg at noon.      magnesium 200 MG TABS       midodrine (PROAMATINE) 5 MG tablet Take 10mg in the morning and 5 mg in the afternoon. Not to be taken within 4 hours of lying down. 180 tablet 3    Multiple Vitamin (DAILY-BROCK) TABS Take 1 tablet by mouth       polyethylene glycol (MIRALAX) 17 GM/Dose powder Take 17 g by mouth daily      QUEtiapine (SEROQUEL) 25 MG tablet Take 2 tablets by mouth every morning and 3 tablets at bedtime      tretinoin (RETIN-A) 0.025 % external cream       ubrogepant (UBRELVY) 50 MG tablet 1-2 tab at onset of migraine, may repeat in 2 hours, max 2 dose per day, 2 day/week, 8 day/month.         PAST SURGICAL HISTORY:  Past Surgical History:   Procedure Laterality Date    EP STUDY TILT TABLE N/A 8/4/2021    Procedure: EP TILT TABLE;  Surgeon: Julio Stone MD;  Location:  HEART CARDIAC CATH LAB       ALLERGIES:     Allergies   Allergen Reactions    Oxcarbazepine Unknown and Rash       FAMILY HISTORY:  No relevant family history to report    SOCIAL HISTORY:  Social History     Tobacco Use    Smoking status: Never    Smokeless tobacco: Never       ROS:   Constitutional: No fever, chills, or sweats. Weight stable.   ENT: No visual disturbance or hearing difficulty apart from what is noted in HPI during vigorous exercise  Cardiovascular: As per HPI.   Respiratory: No cough, hemoptysis.    GI: No nausea, vomiting,    : No hematuria.   Integument: Negative.   Psychiatric: Negative.   Hematologic:  no easy bleeding.  Neuro: Negative.   Endocrinology: No significant heat or cold intolerance   Musculoskeletal: No myalgia or other rheumatologic complaint at the present time.    Exam:  There were no vitals taken for this visit.  GENERAL APPEARANCE: healthy, alert and no distress  HEENT: no icterus, no xanthelasmas, normal pupil size and reaction, normal palate, mucosa moist, no central cyanosis  NECK:JVP not elevated  RESPIRATORY:no rales, rhonchi or wheezes, no use of accessory muscles, respirations are unlabored, normal respiratory rate  NEURO: alert and oriented to person/place/time, normal speech,and affect  SKIN: no ecchymoses, no rashes    Labs:  CBC RESULTS:   Lab Results   Component Value Date    WBC 8.1 07/15/2021    RBC 4.42  07/15/2021    HGB 13.4 07/15/2021    HCT 40.3 07/15/2021    MCV 91 07/15/2021    MCH 30.3 07/15/2021    MCHC 33.3 07/15/2021    RDW 11.8 07/15/2021     07/15/2021       BMP RESULTS:  Lab Results   Component Value Date     07/15/2021    POTASSIUM 4.1 07/15/2021    CHLORIDE 105 07/15/2021    CO2 29 07/15/2021    ANIONGAP 2 (L) 07/15/2021     (H) 07/15/2021    BUN 18 07/15/2021    CR 0.70 07/15/2021    GFRESTIMATED >90 07/15/2021    TERE 8.8 07/15/2021        INR RESULTS:  No results found for: INR    Procedures:  PULMONARY FUNCTION TESTS:        View : No data to display.                  ECHOCARDIOGRAM:   No results found for this or any previous visit (from the past 8760 hour(s)).      Assessment and Plan:   1.  Diabetes mellitus-insulin-dependent  2.  High-level vigorous exercise (bicycling) with lightheadedness (likely due to biking in the standing position for extended periods)  3.  Chronotropic incompetence likely secondary #1    Plan  1.  Continue exercise with good electrolyte/salt hydration and maximize fluid intake  2.  Find out if there are consulting exercise physiologist on campus who might help with exercise prescription  3.  Clinic follow-up about 4 months    Total elapsed time today with chart review, clinic visit and documentation 60 minutes    Video on 5: 05  Video off 5: 45  Platform Doximity  Patient at home; clinic [facility] heart    I very much appreciated the opportunity to see and assess Janis Sheehan in the clinic today.  Please do not hesitate to contact my office if you have any questions or concerns.      Julio Stone MD  Cardiac Arrhythmia Service  [facility]  [phone]      CC  SELF, REFERRED        Please do not hesitate to contact me if you have any questions/concerns.     Sincerely,     Julio Stone MD

## 2023-06-01 NOTE — NURSING NOTE
Chief Complaint   Patient presents with     Follow Up     Follow up from post stress test and 48 hour monitor. Medications/allergies reviewed with pt, no changes per pt.        Is the patient currently in the state of [place]? YES    Visit mode:VIDEO    If the visit is dropped, the patient can be reconnected by: VIDEO VISIT: Text to cell phone: [phone]    Will anyone else be joining the visit? NO      How would you like to obtain your AVS? MyChart    Are changes needed to the allergy or medication list? NO    Patient denies any changes since check-in regarding medication and allergies and states all information entered during check-in remains accurate.    Shamar Larson, Visit Facilitator/MA.

## 2023-06-01 NOTE — PROGRESS NOTES
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  She has been very active bicycling and noted that performance is improved and she is very conscientious about taking in salt and volume.      Her diabetes is an issue from a chronotropic perspective and during her recent cardiopulmonary stress test heart rate maxed out at 135 bpm (see summary below).  She uses a Fitbit and indicates that it is rare that her spontaneous heart rate during exercise will be in the 140-145 range.  In the past she has noted herself to be hypotensive at the end of races.  With increased hydration this seems to be improving.    She believes that she requires more volume during writing than her colleagues.  During a recent 100 mile ride she consumed over 5 L of fluid which is probably not an excessive amount but she thought it was more than the average.  Additionally, she notes that when she is riding she is usually standing and this may contribute to.'s of lightheadedness with visual loss and hearing loss when vigorously exercising.  Potentially there is transfer of cardiac output to the working muscles and inadequate flow to the head.  I suggested that she try horizontal riding for part of the time to see if that might give her symptoms.    During her last 100 mile ride her blood sugar ranged from .  She has been is using conventional electrolyte drinks with some sugar such as Gatorade.  Other times she supplements her sodium and electrolyte intake using 4 packets of electrolyte supplement  which gives her approximately 1 g of salt.  I encouraged her to continue to be focused on aggressive hydration.  This does present a logistical problem during long bike rides but nevertheless has proven effective for Janis in improving her performance in reducing her risk of falls and injury.    It should be noted that the cardiopulmonary test was  undertaken in the recumbent position which proved difficult for Janis as it is not typical of the way she rides her bike..    C-P Stress Test  A cardiopulmonary stress test was performed following a bicycle protocol with the patient exercising for 11 minutes and 40 seconds under the supervision of Dr. Briscoe.  Blood pressure demonstrated a normal response to exercise.  Heart rate demonstrated a blunted response to exercise.     The stress electrocardiogram is negative for ischemia at the heart rate achieved.  The negative predictive value of the examination is reduced due to inability to achieve target HR.     There is no prior study for comparison.     The patient's exercise capacity is average.  Max HR was 135/min   (vs expected 80% max approx 160/min)    We agreed at the end of this visit that she should continue her exercise and focus on finding means to provide adequate hydration and electrolyte intake during her rides.  I also suggested that she could check with colleagues to determine whether her fluid intake is indeed substantially more than theirs.  It seems likely that she is in the right ballpark for volume intake during competition.  We also discussed that her diabetes may cause some chronotropic limitation which would need to be compensated for by more muscle activity and requiring more fluid volume.  It may be useful for her to be seen by exercise physiologist in this regard.  We will check to see if there are any such individuals available at [place].  In the meantime we will follow-up in several months to ascertain her progress.    PAST MEDICAL HISTORY:  No past medical history on file.    CURRENT MEDICATIONS:  Current Outpatient Medications   Medication Sig Dispense Refill     atorvastatin (LIPITOR) 10 MG tablet Take 20 mg by mouth daily       blood glucose (NO BRAND SPECIFIED) test strip Use 1 strip to test blood glucose 5 times a day.       blood glucose calibration (FREESTYLE CONTROL)  solution To calibrate meter       clonazePAM (KLONOPIN) 0.5 MG tablet Take 1 tablet by mouth every morning, take 1 and 1/2 tablets every night at bedtime and additional 1 tablet as needed daily for anxiety.       coenzyme Q-10 10 MG CAPS Take 400 mg by mouth       Continuous Blood Gluc Sensor (DEXCOM G6 SENSOR) MISC Change every 10 days.  (Dexcom G6 Glucose Sensor, #[id number]       Continuous Blood Gluc Transmit (DEXCOM G6 TRANSMITTER) MISC Replace every 90 days.  (Dexcom G6 Transmitter, #[id number]-002)       dapsone 5 % topical gel Apply topically as needed       doxycycline hyclate (PERIOSTAT) 20 MG tablet Take 1 tablet by mouth 2 times daily with meals       fludrocortisone (FLORINEF) 0.1 MG tablet Take 1 tablet (0.1 mg) by mouth daily 90 tablet 3     FLUoxetine (PROZAC) 40 MG capsule Take 120 mg by mouth       ibuprofen (ADVIL/MOTRIN) 200 MG tablet Take 800 mg by mouth       insulin aspart (FIASP) 100 UNIT/ML vial 40-70 units in insulin pump       Insulin Disposable Pump (OMNIPOD 5 PACK) MISC Change every 2 to 3 days.       levonorgestrel (MIRENA) 20 MCG/24HR IUD 1 each by Intrauterine route       levothyroxine (SYNTHROID/LEVOTHROID) 175 MCG tablet Take 175 mcg by mouth every 48 hours       levothyroxine (SYNTHROID/LEVOTHROID) 200 MCG tablet Take 200 mcg by mouth       lisdexamfetamine (VYVANSE) 20 MG capsule The patient takes 40 mg in the AM and 20 mg at noon.       magnesium 200 MG TABS        midodrine (PROAMATINE) 5 MG tablet Take 10mg in the morning and 5 mg in the afternoon. Not to be taken within 4 hours of lying down. 180 tablet 3     Multiple Vitamin (DAILY-BROCK) TABS Take 1 tablet by mouth       polyethylene glycol (MIRALAX) 17 GM/Dose powder Take 17 g by mouth daily       QUEtiapine (SEROQUEL) 25 MG tablet Take 2 tablets by mouth every morning and 3 tablets at bedtime       tretinoin (RETIN-A) 0.025 % external cream        ubrogepant (UBRELVY) 50 MG tablet 1-2 tab at onset of migraine, may repeat in 2  hours, max 2 dose per day, 2 day/week, 8 day/month.         PAST SURGICAL HISTORY:  Past Surgical History:   Procedure Laterality Date     EP STUDY TILT TABLE N/A 8/4/2021    Procedure: EP TILT TABLE;  Surgeon: Julio Stone MD;  Location:  HEART CARDIAC CATH LAB       ALLERGIES:     Allergies   Allergen Reactions     Oxcarbazepine Unknown and Rash       FAMILY HISTORY:  No relevant family history to report    SOCIAL HISTORY:  Social History     Tobacco Use     Smoking status: Never     Smokeless tobacco: Never       ROS:   Constitutional: No fever, chills, or sweats. Weight stable.   ENT: No visual disturbance or hearing difficulty apart from what is noted in HPI during vigorous exercise  Cardiovascular: As per HPI.   Respiratory: No cough, hemoptysis.    GI: No nausea, vomiting,    : No hematuria.   Integument: Negative.   Psychiatric: Negative.   Hematologic:  no easy bleeding.  Neuro: Negative.   Endocrinology: No significant heat or cold intolerance   Musculoskeletal: No myalgia or other rheumatologic complaint at the present time.    Exam:  There were no vitals taken for this visit.  GENERAL APPEARANCE: healthy, alert and no distress  HEENT: no icterus, no xanthelasmas, normal pupil size and reaction, normal palate, mucosa moist, no central cyanosis  NECK:JVP not elevated  RESPIRATORY:no rales, rhonchi or wheezes, no use of accessory muscles, respirations are unlabored, normal respiratory rate  NEURO: alert and oriented to person/place/time, normal speech,and affect  SKIN: no ecchymoses, no rashes    Labs:  CBC RESULTS:   Lab Results   Component Value Date    WBC 8.1 07/15/2021    RBC 4.42 07/15/2021    HGB 13.4 07/15/2021    HCT 40.3 07/15/2021    MCV 91 07/15/2021    MCH 30.3 07/15/2021    MCHC 33.3 07/15/2021    RDW 11.8 07/15/2021     07/15/2021       BMP RESULTS:  Lab Results   Component Value Date     07/15/2021    POTASSIUM 4.1 07/15/2021    CHLORIDE 105 07/15/2021    CO2 29  07/15/2021    ANIONGAP 2 (L) 07/15/2021     (H) 07/15/2021    BUN 18 07/15/2021    CR 0.70 07/15/2021    GFRESTIMATED >90 07/15/2021    TERE 8.8 07/15/2021        INR RESULTS:  No results found for: INR    Procedures:  PULMONARY FUNCTION TESTS:        View : No data to display.                  ECHOCARDIOGRAM:   No results found for this or any previous visit (from the past 8760 hour(s)).      Assessment and Plan:   1.  Diabetes mellitus-insulin-dependent  2.  High-level vigorous exercise (bicycling) with lightheadedness (likely due to biking in the standing position for extended periods)  3.  Chronotropic incompetence likely secondary #1    Plan  1.  Continue exercise with good electrolyte/salt hydration and maximize fluid intake  2.  Find out if there are consulting exercise physiologist on campus who might help with exercise prescription  3.  Clinic follow-up about 4 months    Total elapsed time today with chart review, clinic visit and documentation 60 minutes    Video on 5: 05  Video off 5: 45  Platform Doximity  Patient at home; clinic [facility] heart    I very much appreciated the opportunity to see and assess Janis Sheehan in the clinic today.  Please do not hesitate to contact my office if you have any questions or concerns.      Julio Stone MD  Cardiac Arrhythmia Service  [facility]  [phone]      CC  SELF, REFERRED

## 2023-06-02 ENCOUNTER — TELEPHONE (OUTPATIENT)
Dept: CARE COORDINATION | Facility: CLINIC | Age: 43
End: 2023-06-02

## 2023-06-02 NOTE — PATIENT INSTRUCTIONS
You were seen in the Electrophysiology Clinic today by: Dr Stone    Plan:     Follow up Visit: 4 months with Dr Stone      If you have further questions, please utilize Currently to contact us.     Your Care Team:    EP Cardiology   Telephone Number     Nurse Line  Ivory Pinzon, RN   Carrie Becerra, RN   [phone]     For scheduling appointments:   Lilian   [phone]   For procedure scheduling:    Chandni Ferro     [phone]   For the Device Clinic (Pacemakers, ICDs, Loop Recorders)    During business hours: [phone]  After business hours:   [phone]- select option 4 and ask for job code [id number].       On-call cardiologist for after hours or on weekends:   [phone], option #4, and ask to speak to the on-call cardiologist.     Cardiovascular Clinic:   [address] [place] [address]. [place] [address]      As always, Thank you for trusting us with your health care needs!

## 2023-06-02 NOTE — TELEPHONE ENCOUNTER
Left Voicemail (1st Attempt) and Sent Mychart (1st Attempt) for the patient to call back and schedule the following:    Appointment type: Return EP   Provider: Dr. Stone   Return date: 4 months, 10/2/2023  Specialty phone number: [phone]  Additional appointment(s) needed: n/a   Additonal Notes:       You were seen in the Electrophysiology Clinic today by: Dr Stone     Plan:        Follow up Visit: 4 months with Dr Stoen

## 2023-06-07 DIAGNOSIS — G90.9 AUTONOMIC DYSFUNCTION: ICD-10-CM

## 2023-06-12 RX ORDER — FLUDROCORTISONE ACETATE 0.1 MG/1
TABLET ORAL
Qty: 90 TABLET | Refills: 3 | OUTPATIENT
Start: 2023-06-12

## 2023-09-07 DIAGNOSIS — G90.9 AUTONOMIC DYSFUNCTION: ICD-10-CM

## 2023-09-11 NOTE — TELEPHONE ENCOUNTER
midodrine (PROAMATINE) 5 MG tablet 180 tablet 3 4/5/2023       Routing refill request to provider for review/approval because:  Drug not on the refill protocol     Amy Gage RN

## 2023-09-12 RX ORDER — MIDODRINE HYDROCHLORIDE 5 MG/1
TABLET ORAL
Qty: 270 TABLET | Refills: 2 | Status: SHIPPED | OUTPATIENT
Start: 2023-09-12 | End: 2024-08-28

## 2023-10-08 ENCOUNTER — HEALTH MAINTENANCE LETTER (OUTPATIENT)
Age: 43
End: 2023-10-08

## 2023-10-19 ENCOUNTER — VIRTUAL VISIT (OUTPATIENT)
Dept: CARDIOLOGY | Facility: CLINIC | Age: 43
End: 2023-10-19
Attending: INTERNAL MEDICINE
Payer: COMMERCIAL

## 2023-10-19 VITALS — WEIGHT: 152 LBS | BODY MASS INDEX: 23.86 KG/M2 | HEIGHT: 67 IN

## 2023-10-19 DIAGNOSIS — I95.1 ORTHOSTATIC HYPOTENSION: ICD-10-CM

## 2023-10-19 PROCEDURE — 99215 OFFICE O/P EST HI 40 MIN: CPT | Mod: VID | Performed by: INTERNAL MEDICINE

## 2023-10-19 ASSESSMENT — PAIN SCALES - GENERAL: PAINLEVEL: NO PAIN (0)

## 2023-10-19 NOTE — NURSING NOTE
Is the patient currently in the state of [place]? YES    Visit mode:VIDEO    If the visit is dropped, the patient can be reconnected by: VIDEO VISIT: Text to cell phone:   Telephone Information:   Mobile [phone]       Will anyone else be joining the visit? NO  (If patient encounters technical issues they should call [phone]    How would you like to obtain your AVS? MyChart    Are changes needed to the allergy or medication list? Pt stated no med changes    Reason for visit: RECHECK    No other vitals to report per pt    Ynes HOLTF

## 2023-10-19 NOTE — LETTER
10/19/2023      RE: Janis Sheehan  [id number]  [facility] [address]       Dear Colleague,    Thank you for the opportunity to participate in the care of your patient, Janis Sheehan, at the [facility] [place] [facility]. Please see a copy of my visit note below.    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  She has generally done long distance riding although less currently.  She nonetheless continues to compete and will be going to [place] this week with her team to collect a medal.    At today's visit Janis indicates that her principal new symptom appears to be a tendency to be somewhat sleepy and fatigued in the early morning.  This problem seems to diminish around noon.  Her blood sugars are monitored and seem to be doing okay.  There does not appear to be any change in her generally high level exercise tolerance.  We did discuss the importance of hydration early in the day after waking up and she will give that a try to see if it makes a difference.    From time to time she has blood pressures in the range of 130/80 and was concerned.  I reassured her that periodic blood pressures and that range are not harmful.  She also has blood pressures from time to time in the low range of 70 systolic.  In that instance I suggested that she take an extra midodrine and additional hydration.  Fortunately the blood pressure variations are not common and usually she is in a very acceptable range.    Apart from the measures noted above, we will not be making any changes in her overall treatment plan at this visit.      PAST MEDICAL HISTORY:  No past medical history on file.    CURRENT MEDICATIONS:  Current Outpatient Medications   Medication Sig Dispense Refill    atorvastatin (LIPITOR) 10 MG tablet Take 20 mg by mouth daily      blood glucose (NO BRAND  SPECIFIED) test strip Use 1 strip to test blood glucose 5 times a day.      blood glucose calibration (FREESTYLE CONTROL) solution To calibrate meter      clonazePAM (KLONOPIN) 0.5 MG tablet Take 1 tablet by mouth every morning, take 1 and 1/2 tablets every night at bedtime and additional 1 tablet as needed daily for anxiety.      coenzyme Q-10 10 MG CAPS Take 400 mg by mouth      Continuous Blood Gluc Sensor (DEXCOM G6 SENSOR) MISC Change every 10 days.  (Dexcom G6 Glucose Sensor, #[id number]      Continuous Blood Gluc Transmit (DEXCOM G6 TRANSMITTER) MISC Replace every 90 days.  (Dexcom G6 Transmitter, #[id number])      dapsone 5 % topical gel Apply topically as needed      doxycycline hyclate (PERIOSTAT) 20 MG tablet Take 1 tablet by mouth 2 times daily with meals      fludrocortisone (FLORINEF) 0.1 MG tablet Take 1 tablet (0.1 mg) by mouth daily 90 tablet 3    FLUoxetine (PROZAC) 40 MG capsule Take 120 mg by mouth      ibuprofen (ADVIL/MOTRIN) 200 MG tablet Take 800 mg by mouth      insulin aspart (FIASP) 100 UNIT/ML vial 40-70 units in insulin pump      Insulin Disposable Pump (OMNIPOD 5 PACK) MISC Change every 2 to 3 days.      levonorgestrel (MIRENA) 20 MCG/24HR IUD 1 each by Intrauterine route      levothyroxine (SYNTHROID/LEVOTHROID) 175 MCG tablet Take 175 mcg by mouth every 48 hours      levothyroxine (SYNTHROID/LEVOTHROID) 200 MCG tablet Take 200 mcg by mouth      lisdexamfetamine (VYVANSE) 20 MG capsule The patient takes 40 mg in the AM and 20 mg at noon.      magnesium 200 MG TABS       midodrine (PROAMATINE) 5 MG tablet TAKE 2 TABS BY MOUTH IN THE AM 1 TAB IN THE AFTERNOON. NOT TO BE TAKEN WITHIN 4 HOURS OF LYING DOWN. 270 tablet 2    Multiple Vitamin (DAILY-BROCK) TABS Take 1 tablet by mouth      polyethylene glycol (MIRALAX) 17 GM/Dose powder Take 17 g by mouth daily      QUEtiapine (SEROQUEL) 25 MG tablet Take 2 tablets by mouth every morning and 3 tablets at bedtime      tretinoin (RETIN-A) 0.025 %  "external cream       ubrogepant (UBRELVY) 50 MG tablet 1-2 tab at onset of migraine, may repeat in 2 hours, max 2 dose per day, 2 day/week, 8 day/month.         PAST SURGICAL HISTORY:  Past Surgical History:   Procedure Laterality Date    EP STUDY TILT TABLE N/A 8/4/2021    Procedure: EP TILT TABLE;  Surgeon: Julio Stone MD;  Location:  HEART CARDIAC CATH LAB       ALLERGIES:     Allergies   Allergen Reactions    Oxcarbazepine Unknown and Rash       FAMILY HISTORY:  No pertinent history apart from diabetes    SOCIAL HISTORY:  Social History     Tobacco Use    Smoking status: Never    Smokeless tobacco: Never       ROS:   Constitutional: No fever, chills, or sweats. Weight stable.   ENT: No visual disturbance, ear ache, epistaxis, sore throat.   Cardiovascular: As per HPI.   Respiratory: No cough, hemoptysis.    GI: No nausea, vomiting,    : No hematuria.   Integument: Negative.   Psychiatric: Negative.   Hematologic:   no easy bleeding.  Neuro: Negative.   Endocrinology: No significant heat or cold intolerance   Musculoskeletal: No myalgia.    Exam:  Ht 1.689 m (5' 6.5\")   Wt 68.9 kg (152 lb)   BMI 24.17 kg/m    GENERAL APPEARANCE: healthy, alert and no distress  HEENT: no icterus,  no central cyanosis  NECK: , JVP not elevated  RESPIRATORY: no rales, rhonchi or wheezes, no use of accessory muscles, no retractions, respirations are unlabored, normal respiratory rate  NEURO: alert and oriented to person/place/time, normal speech, gait and affect.  SKIN: no ecchymoses, no rashes    Labs:  CBC RESULTS:   Lab Results   Component Value Date    WBC 8.1 07/15/2021    RBC 4.42 07/15/2021    HGB 13.4 07/15/2021    HCT 40.3 07/15/2021    MCV 91 07/15/2021    MCH 30.3 07/15/2021    MCHC 33.3 07/15/2021    RDW 11.8 07/15/2021     07/15/2021       BMP RESULTS:  Lab Results   Component Value Date     07/15/2021    POTASSIUM 4.1 07/15/2021    CHLORIDE 105 07/15/2021    CO2 29 07/15/2021    ANIONGAP 2 " "(L) 07/15/2021     (H) 07/15/2021    BUN 18 07/15/2021    CR 0.70 07/15/2021    GFRESTIMATED >90 07/15/2021    TERE 8.8 07/15/2021        INR RESULTS:  No results found for: \"INR\"    Procedures:  PULMONARY FUNCTION TESTS:        No data to display                  ECHOCARDIOGRAM:   No results found for this or any previous visit (from the past 8760 hour(s)).      Assessment and Plan:   1.  Autonomic dysfunction in high performing athletic individual-relatively well compensated  2.  Diabetes mellitus with well-controlled blood sugars    Plan  1.  Continue current treatment strategy  2.  Patient to increase fluid intake first thing in the morning and report if this is helpful for avoiding morning fatigue  3.  Follow-up video visit 6 to 8 months    Total elapsed time today with chart review, clinic visit and documentation 40 minutes    Video on 4: 30 p.m.; off 4: 48 PM  Platform Doximity  Patient at home; clinic [facility] heart    I very much appreciated the opportunity to see and assess Janis Sheehan in the clinic today. Please do not hesitate to contact my office if you have any questions or concerns.      Julio Stone MD  Cardiac Arrhythmia Service  [facility]  [phone]         CC  RUFINO BURGOS      "

## 2023-10-19 NOTE — PROGRESS NOTES
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  She has generally done long distance riding although less currently.  She nonetheless continues to compete and will be going to [place] this week with her team to collect a medal.    At today's visit Janis indicates that her principal new symptom appears to be a tendency to be somewhat sleepy and fatigued in the early morning.  This problem seems to diminish around noon.  Her blood sugars are monitored and seem to be doing okay.  There does not appear to be any change in her generally high level exercise tolerance.  We did discuss the importance of hydration early in the day after waking up and she will give that a try to see if it makes a difference.    From time to time she has blood pressures in the range of 130/80 and was concerned.  I reassured her that periodic blood pressures and that range are not harmful.  She also has blood pressures from time to time in the low range of 70 systolic.  In that instance I suggested that she take an extra midodrine and additional hydration.  Fortunately the blood pressure variations are not common and usually she is in a very acceptable range.    Apart from the measures noted above, we will not be making any changes in her overall treatment plan at this visit.      PAST MEDICAL HISTORY:  No past medical history on file.    CURRENT MEDICATIONS:  Current Outpatient Medications   Medication Sig Dispense Refill    atorvastatin (LIPITOR) 10 MG tablet Take 20 mg by mouth daily      blood glucose (NO BRAND SPECIFIED) test strip Use 1 strip to test blood glucose 5 times a day.      blood glucose calibration (FREESTYLE CONTROL) solution To calibrate meter      clonazePAM (KLONOPIN) 0.5 MG tablet Take 1 tablet by mouth every morning, take 1 and 1/2 tablets every night at bedtime and additional 1 tablet as needed daily for anxiety.      coenzyme Q-10 10  MG CAPS Take 400 mg by mouth      Continuous Blood Gluc Sensor (DEXCOM G6 SENSOR) MISC Change every 10 days.  (Dexcom G6 Glucose Sensor, #[id number]      Continuous Blood Gluc Transmit (DEXCOM G6 TRANSMITTER) MISC Replace every 90 days.  (Dexcom G6 Transmitter, #[id number])      dapsone 5 % topical gel Apply topically as needed      doxycycline hyclate (PERIOSTAT) 20 MG tablet Take 1 tablet by mouth 2 times daily with meals      fludrocortisone (FLORINEF) 0.1 MG tablet Take 1 tablet (0.1 mg) by mouth daily 90 tablet 3    FLUoxetine (PROZAC) 40 MG capsule Take 120 mg by mouth      ibuprofen (ADVIL/MOTRIN) 200 MG tablet Take 800 mg by mouth      insulin aspart (FIASP) 100 UNIT/ML vial 40-70 units in insulin pump      Insulin Disposable Pump (OMNIPOD 5 PACK) MISC Change every 2 to 3 days.      levonorgestrel (MIRENA) 20 MCG/24HR IUD 1 each by Intrauterine route      levothyroxine (SYNTHROID/LEVOTHROID) 175 MCG tablet Take 175 mcg by mouth every 48 hours      levothyroxine (SYNTHROID/LEVOTHROID) 200 MCG tablet Take 200 mcg by mouth      lisdexamfetamine (VYVANSE) 20 MG capsule The patient takes 40 mg in the AM and 20 mg at noon.      magnesium 200 MG TABS       midodrine (PROAMATINE) 5 MG tablet TAKE 2 TABS BY MOUTH IN THE AM 1 TAB IN THE AFTERNOON. NOT TO BE TAKEN WITHIN 4 HOURS OF LYING DOWN. 270 tablet 2    Multiple Vitamin (DAILY-BROCK) TABS Take 1 tablet by mouth      polyethylene glycol (MIRALAX) 17 GM/Dose powder Take 17 g by mouth daily      QUEtiapine (SEROQUEL) 25 MG tablet Take 2 tablets by mouth every morning and 3 tablets at bedtime      tretinoin (RETIN-A) 0.025 % external cream       ubrogepant (UBRELVY) 50 MG tablet 1-2 tab at onset of migraine, may repeat in 2 hours, max 2 dose per day, 2 day/week, 8 day/month.         PAST SURGICAL HISTORY:  Past Surgical History:   Procedure Laterality Date    EP STUDY TILT TABLE N/A 8/4/2021    Procedure: EP TILT TABLE;  Surgeon: Julio Stone MD;  Location:   "HEART CARDIAC CATH LAB       ALLERGIES:     Allergies   Allergen Reactions    Oxcarbazepine Unknown and Rash       FAMILY HISTORY:  No pertinent history apart from diabetes    SOCIAL HISTORY:  Social History     Tobacco Use    Smoking status: Never    Smokeless tobacco: Never       ROS:   Constitutional: No fever, chills, or sweats. Weight stable.   ENT: No visual disturbance, ear ache, epistaxis, sore throat.   Cardiovascular: As per HPI.   Respiratory: No cough, hemoptysis.    GI: No nausea, vomiting,    : No hematuria.   Integument: Negative.   Psychiatric: Negative.   Hematologic:   no easy bleeding.  Neuro: Negative.   Endocrinology: No significant heat or cold intolerance   Musculoskeletal: No myalgia.    Exam:  Ht 1.689 m (5' 6.5\")   Wt 68.9 kg (152 lb)   BMI 24.17 kg/m    GENERAL APPEARANCE: healthy, alert and no distress  HEENT: no icterus,  no central cyanosis  NECK: , JVP not elevated  RESPIRATORY: no rales, rhonchi or wheezes, no use of accessory muscles, no retractions, respirations are unlabored, normal respiratory rate  NEURO: alert and oriented to person/place/time, normal speech, gait and affect.  SKIN: no ecchymoses, no rashes    Labs:  CBC RESULTS:   Lab Results   Component Value Date    WBC 8.1 07/15/2021    RBC 4.42 07/15/2021    HGB 13.4 07/15/2021    HCT 40.3 07/15/2021    MCV 91 07/15/2021    MCH 30.3 07/15/2021    MCHC 33.3 07/15/2021    RDW 11.8 07/15/2021     07/15/2021       BMP RESULTS:  Lab Results   Component Value Date     07/15/2021    POTASSIUM 4.1 07/15/2021    CHLORIDE 105 07/15/2021    CO2 29 07/15/2021    ANIONGAP 2 (L) 07/15/2021     (H) 07/15/2021    BUN 18 07/15/2021    CR 0.70 07/15/2021    GFRESTIMATED >90 07/15/2021    TERE 8.8 07/15/2021        INR RESULTS:  No results found for: \"INR\"    Procedures:  PULMONARY FUNCTION TESTS:        No data to display                  ECHOCARDIOGRAM:   No results found for this or any previous visit (from the past " 8760 hour(s)).      Assessment and Plan:   1.  Autonomic dysfunction in high performing athletic individual-relatively well compensated  2.  Diabetes mellitus with well-controlled blood sugars    Plan  1.  Continue current treatment strategy  2.  Patient to increase fluid intake first thing in the morning and report if this is helpful for avoiding morning fatigue  3.  Follow-up video visit 6 to 8 months    Total elapsed time today with chart review, clinic visit and documentation 40 minutes    Video on 4: 30 p.m.; off 4: 48 PM  Platform Doximity  Patient at home; clinic [facility] heart    I very much appreciated the opportunity to see and assess Janis Sheehan in the clinic today. Please do not hesitate to contact my office if you have any questions or concerns.      Julio Stone MD  Cardiac Arrhythmia Service  [facility]  [phone]         CC  RUFINO BURGOS

## 2023-10-19 NOTE — PATIENT INSTRUCTIONS
You were seen in the Electrophysiology Clinic today by: Dr Stone    Plan:     Follow up Visit: 6 months      If you have further questions, please utilize Bex to contact us.     Your Care Team:    EP Cardiology   Telephone Number     Nurse Line  Ivory Pinzon, RN   Carrie Becerra, PIERRE English, PIERRE   [phone]     For scheduling appointments:   Lilian   [phone]   For procedure scheduling:    Chandni Ferro     [phone]   For the Device Clinic (Pacemakers, ICDs, Loop Recorders)    During business hours: [phone]  After business hours:   [phone]- select option 4 and ask for job code [id number].       On-call cardiologist for after hours or on weekends:   [phone], option #4, and ask to speak to the on-call cardiologist.     Cardiovascular Clinic:   [address] [place] [address]. [place] [address]      As always, Thank you for trusting us with your health care needs!

## 2023-12-12 DIAGNOSIS — G90.9 AUTONOMIC DYSFUNCTION: ICD-10-CM

## 2023-12-14 NOTE — TELEPHONE ENCOUNTER
FLUDROCORTISONE 0.1 MG TABLET       Last Written Prescription Date:  11-1-22  Last Fill Quantity: 90,   # refills: 3  Last Office Visit : 10-19-23  Future Office visit:  none    Routing refill request to provider for review/approval because:  Med not on cards protocol

## 2023-12-18 RX ORDER — FLUDROCORTISONE ACETATE 0.1 MG/1
0.1 TABLET ORAL DAILY
Qty: 90 TABLET | Refills: 3 | Status: SHIPPED | OUTPATIENT
Start: 2023-12-18

## 2024-03-28 ENCOUNTER — TELEPHONE (OUTPATIENT)
Dept: CARDIOLOGY | Facility: CLINIC | Age: 44
End: 2024-03-28
Payer: COMMERCIAL

## 2024-04-02 ENCOUNTER — TELEPHONE (OUTPATIENT)
Dept: CARDIOLOGY | Facility: CLINIC | Age: 44
End: 2024-04-02
Payer: COMMERCIAL

## 2024-08-24 DIAGNOSIS — G90.9 AUTONOMIC DYSFUNCTION: ICD-10-CM

## 2024-08-28 RX ORDER — MIDODRINE HYDROCHLORIDE 5 MG/1
TABLET ORAL
Qty: 270 TABLET | Refills: 3 | Status: SHIPPED | OUTPATIENT
Start: 2024-08-28

## 2024-08-28 NOTE — TELEPHONE ENCOUNTER
midodrine (PROAMATINE) 5 MG tablet   270 tablet 2 9/12/2023       Last Office Visit : 10- ---Follow-up video visit 6 to 8 months   Future Office visit:  none

## 2024-08-29 ENCOUNTER — TELEPHONE (OUTPATIENT)
Dept: CARDIOLOGY | Facility: CLINIC | Age: 44
End: 2024-08-29
Payer: COMMERCIAL

## 2024-10-27 DIAGNOSIS — G90.9 AUTONOMIC DYSFUNCTION: ICD-10-CM

## 2024-10-30 NOTE — TELEPHONE ENCOUNTER
fludrocortisone (FLORINEF) 0.1 MG tablet 90 tablet 3 12/18/2023     Last Office Visit: 10/19/23  Future Office visit:   NONE    Routing refill request to provider for review/approval because:  NOT ON CARDIOLOGY REFILL PROTOCOL    Amy Gage RN  [facility]/Red Flag Triage & Med Refill Team

## 2024-11-04 RX ORDER — FLUDROCORTISONE ACETATE 0.1 MG/1
0.1 TABLET ORAL DAILY
Qty: 90 TABLET | Refills: 1 | Status: SHIPPED | OUTPATIENT
Start: 2024-11-04

## 2024-12-01 ENCOUNTER — HEALTH MAINTENANCE LETTER (OUTPATIENT)
Age: 44
End: 2024-12-01

## 2025-02-20 ENCOUNTER — TELEPHONE (OUTPATIENT)
Dept: CARDIOLOGY | Facility: CLINIC | Age: 45
End: 2025-02-20
Payer: COMMERCIAL

## 2025-02-20 NOTE — TELEPHONE ENCOUNTER
Left Voicemail (1st Attempt) for the patient to call back and schedule the following:    Appointment type: Return EP  Provider: Chase  Return date: Next mann  Specialty phone number: [phone] opt 1  Additional appointment(s) needed: N/A  Additonal Notes: N/A

## 2025-03-03 ENCOUNTER — TELEPHONE (OUTPATIENT)
Dept: CARDIOLOGY | Facility: CLINIC | Age: 45
End: 2025-03-03
Payer: COMMERCIAL

## 2025-03-03 NOTE — TELEPHONE ENCOUNTER
Patient Contacted for the patient to call back and schedule the following:    Appointment type: Return EP  Provider: Chase  Return date: 05/09/2025  Specialty phone number: [phone] opt 1  Additional appointment(s) needed: N/A  Additonal Notes: N/A

## 2025-03-20 DIAGNOSIS — G90.9 AUTONOMIC DYSFUNCTION: ICD-10-CM

## 2025-03-25 RX ORDER — FLUDROCORTISONE ACETATE 0.1 MG/1
0.1 TABLET ORAL DAILY
Qty: 90 TABLET | Refills: 1 | OUTPATIENT
Start: 2025-03-25

## 2025-03-25 NOTE — TELEPHONE ENCOUNTER
fludrocortisone (FLORINEF) 0.1 MG 90 tablet/1 RF 11/4/2024    Sig - Route: TAKE 1 TABLET BY MOUTH EVERY DAY - Oral.  [id number]  too soon for refill

## 2025-08-06 ENCOUNTER — TELEPHONE (OUTPATIENT)
Dept: CARDIOLOGY | Facility: CLINIC | Age: 45
End: 2025-08-06
Payer: COMMERCIAL

## (undated) DEVICE — CUFF FINGER CLEARSIGHT MED CSCM

## (undated) RX ORDER — SODIUM CHLORIDE 9 MG/ML
INJECTION, SOLUTION INTRAVENOUS
Status: DISPENSED
Start: 2021-08-04